# Patient Record
Sex: FEMALE | Race: WHITE | NOT HISPANIC OR LATINO | Employment: FULL TIME | URBAN - METROPOLITAN AREA
[De-identification: names, ages, dates, MRNs, and addresses within clinical notes are randomized per-mention and may not be internally consistent; named-entity substitution may affect disease eponyms.]

---

## 2019-04-19 ENCOUNTER — EH NON-PROVIDER (OUTPATIENT)
Dept: OCCUPATIONAL MEDICINE | Facility: CLINIC | Age: 39
End: 2019-04-19

## 2019-04-19 ENCOUNTER — EMPLOYEE HEALTH (OUTPATIENT)
Dept: OCCUPATIONAL MEDICINE | Facility: CLINIC | Age: 39
End: 2019-04-19

## 2019-04-19 ENCOUNTER — HOSPITAL ENCOUNTER (OUTPATIENT)
Facility: MEDICAL CENTER | Age: 39
End: 2019-04-19
Attending: PREVENTIVE MEDICINE
Payer: COMMERCIAL

## 2019-04-19 VITALS
BODY MASS INDEX: 26.68 KG/M2 | HEART RATE: 86 BPM | WEIGHT: 197 LBS | SYSTOLIC BLOOD PRESSURE: 110 MMHG | HEIGHT: 72 IN | OXYGEN SATURATION: 97 % | TEMPERATURE: 97.4 F | DIASTOLIC BLOOD PRESSURE: 70 MMHG

## 2019-04-19 DIAGNOSIS — Z02.1 PRE-EMPLOYMENT HEALTH SCREENING EXAMINATION: ICD-10-CM

## 2019-04-19 DIAGNOSIS — Z02.1 PRE-EMPLOYMENT DRUG SCREENING: ICD-10-CM

## 2019-04-19 LAB
AMP AMPHETAMINE: NORMAL
BAR BARBITURATES: NORMAL
BZO BENZODIAZEPINES: NORMAL
COC COCAINE: NORMAL
INT CON NEG: NORMAL
INT CON POS: NORMAL
MDMA ECSTASY: NORMAL
MET METHAMPHETAMINES: NORMAL
MTD METHADONE: NORMAL
OPI OPIATES: NORMAL
OXY OXYCODONE: NORMAL
PCP PHENCYCLIDINE: NORMAL
POC URINE DRUG SCREEN OCDRS: NEGATIVE
THC: NORMAL
VZV IGG SER IA-ACNC: 2.63

## 2019-04-19 PROCEDURE — 8915 PR COMPREHENSIVE PHYSICAL: Performed by: PREVENTIVE MEDICINE

## 2019-04-19 PROCEDURE — 94375 RESPIRATORY FLOW VOLUME LOOP: CPT | Performed by: PREVENTIVE MEDICINE

## 2019-04-19 PROCEDURE — 86480 TB TEST CELL IMMUN MEASURE: CPT | Performed by: PREVENTIVE MEDICINE

## 2019-04-19 PROCEDURE — 86787 VARICELLA-ZOSTER ANTIBODY: CPT | Performed by: PREVENTIVE MEDICINE

## 2019-04-19 PROCEDURE — 80305 DRUG TEST PRSMV DIR OPT OBS: CPT | Performed by: PREVENTIVE MEDICINE

## 2019-04-19 PROCEDURE — 86765 RUBEOLA ANTIBODY: CPT | Performed by: PREVENTIVE MEDICINE

## 2019-04-19 PROCEDURE — 86762 RUBELLA ANTIBODY: CPT | Performed by: PREVENTIVE MEDICINE

## 2019-04-19 PROCEDURE — 86735 MUMPS ANTIBODY: CPT | Performed by: PREVENTIVE MEDICINE

## 2019-04-19 RX ORDER — NORETHINDRONE ACETATE AND ETHINYL ESTRADIOL 1MG-20(21)
KIT ORAL
COMMUNITY
Start: 2017-01-23 | End: 2019-06-29 | Stop reason: SDUPTHER

## 2019-04-19 RX ORDER — BUPROPION HYDROCHLORIDE 100 MG/1
TABLET, EXTENDED RELEASE ORAL
COMMUNITY
Start: 2019-02-25 | End: 2019-08-05

## 2019-04-19 RX ORDER — CITALOPRAM 20 MG/1
TABLET ORAL
COMMUNITY
Start: 2018-10-30 | End: 2019-08-05

## 2019-04-19 NOTE — NON-PROVIDER
Drug Screen.  Titers Drawn Today : Quantiferon, MMr, VZV  Immunizations Up To Date: T-dap,   Declined: Hep B  Mask Fit: CAPR N95 r   flu shot record need pt will provide docs.

## 2019-04-20 LAB
MEV IGG SER IA-ACNC: 0.46
MUV IGG SER IA-ACNC: 0.41
RUBV AB SER QL: 14.1 IU/ML

## 2019-04-22 LAB
GAMMA INTERFERON BACKGROUND BLD IA-ACNC: 0.03 IU/ML
M TB IFN-G BLD-IMP: NEGATIVE
M TB IFN-G CD4+ BCKGRND COR BLD-ACNC: 0.03 IU/ML
MITOGEN IGNF BCKGRD COR BLD-ACNC: >10 IU/ML
QFT TB2 - NIL TBQ2: 0 IU/ML

## 2019-04-24 ENCOUNTER — TELEPHONE (OUTPATIENT)
Dept: OCCUPATIONAL MEDICINE | Facility: CLINIC | Age: 39
End: 2019-04-24

## 2019-04-26 ENCOUNTER — EH NON-PROVIDER (OUTPATIENT)
Dept: OCCUPATIONAL MEDICINE | Facility: CLINIC | Age: 39
End: 2019-04-26

## 2019-04-26 DIAGNOSIS — Z23 NEED FOR MMR VACCINE: ICD-10-CM

## 2019-04-26 PROCEDURE — 90707 MMR VACCINE SC: CPT | Performed by: PREVENTIVE MEDICINE

## 2019-04-29 ENCOUNTER — EH NON-PROVIDER (OUTPATIENT)
Dept: OCCUPATIONAL MEDICINE | Facility: CLINIC | Age: 39
End: 2019-04-29
Payer: COMMERCIAL

## 2019-04-29 DIAGNOSIS — Z71.85 IMMUNIZATION COUNSELING: ICD-10-CM

## 2019-04-29 NOTE — PROGRESS NOTES
Pre-employment medical surveillance reviewed and signed. MMR vaccine series required. MMR #1 administered on 4/26/19. MMR #2 scheduled for 5/31/19.   Quantiferon result documented in immunizations. Document returned to monthly assigned MA.

## 2019-05-31 ENCOUNTER — EH NON-PROVIDER (OUTPATIENT)
Dept: OCCUPATIONAL MEDICINE | Facility: CLINIC | Age: 39
End: 2019-05-31

## 2019-05-31 DIAGNOSIS — Z23 NEED FOR VACCINATION: ICD-10-CM

## 2019-05-31 PROCEDURE — 90707 MMR VACCINE SC: CPT | Performed by: PREVENTIVE MEDICINE

## 2019-06-14 ENCOUNTER — TELEPHONE (OUTPATIENT)
Dept: SCHEDULING | Facility: IMAGING CENTER | Age: 39
End: 2019-06-14

## 2019-06-21 ENCOUNTER — OFFICE VISIT (OUTPATIENT)
Dept: URGENT CARE | Facility: PHYSICIAN GROUP | Age: 39
End: 2019-06-21
Payer: COMMERCIAL

## 2019-06-21 VITALS
RESPIRATION RATE: 16 BRPM | TEMPERATURE: 98 F | BODY MASS INDEX: 27.02 KG/M2 | WEIGHT: 199.2 LBS | DIASTOLIC BLOOD PRESSURE: 84 MMHG | HEART RATE: 62 BPM | SYSTOLIC BLOOD PRESSURE: 118 MMHG | OXYGEN SATURATION: 96 %

## 2019-06-21 DIAGNOSIS — S16.1XXA STRAIN OF NECK MUSCLE, INITIAL ENCOUNTER: ICD-10-CM

## 2019-06-21 DIAGNOSIS — R25.2 SPASM: ICD-10-CM

## 2019-06-21 DIAGNOSIS — S46.911A STRAIN OF RIGHT SHOULDER, INITIAL ENCOUNTER: ICD-10-CM

## 2019-06-21 PROCEDURE — 99203 OFFICE O/P NEW LOW 30 MIN: CPT | Performed by: FAMILY MEDICINE

## 2019-06-21 RX ORDER — CITALOPRAM 40 MG/1
40 TABLET ORAL DAILY
COMMUNITY
End: 2020-01-29 | Stop reason: SDUPTHER

## 2019-06-21 RX ORDER — BUPROPION HYDROCHLORIDE 300 MG/1
300 TABLET ORAL EVERY MORNING
COMMUNITY
End: 2019-09-18 | Stop reason: SDUPTHER

## 2019-06-21 RX ORDER — TIZANIDINE 2 MG/1
2 TABLET ORAL 3 TIMES DAILY
Qty: 30 TAB | Refills: 0 | Status: SHIPPED | OUTPATIENT
Start: 2019-06-21 | End: 2020-08-20

## 2019-06-21 ASSESSMENT — ENCOUNTER SYMPTOMS
EYE REDNESS: 0
SENSORY CHANGE: 0
EYE DISCHARGE: 0
WEIGHT LOSS: 0
FOCAL WEAKNESS: 0
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
## 2019-06-28 RX ORDER — NORETHINDRONE ACETATE AND ETHINYL ESTRADIOL 1MG-20(21)
KIT ORAL
Qty: 28 TAB | OUTPATIENT
Start: 2019-06-28 | End: 2023-02-12

## 2019-06-28 NOTE — TELEPHONE ENCOUNTER
1. Caller Name: Caroline Grewal       Call Back Number: [phone] (home)         Patient approves a detailed voicemail message: N\A    Patient called and asking if we can refill her birth control, she does have a upcoming appointment with you as soon as August 5 1pm. She is out of refills. Please advise.

## 2019-06-29 ENCOUNTER — OFFICE VISIT (OUTPATIENT)
Dept: URGENT CARE | Facility: CLINIC | Age: 39
End: 2019-06-29
Payer: COMMERCIAL

## 2019-06-29 VITALS
TEMPERATURE: 97.6 F | WEIGHT: 194 LBS | SYSTOLIC BLOOD PRESSURE: 102 MMHG | HEIGHT: 72 IN | OXYGEN SATURATION: 100 % | RESPIRATION RATE: 18 BRPM | BODY MASS INDEX: 26.28 KG/M2 | DIASTOLIC BLOOD PRESSURE: 60 MMHG | HEART RATE: 64 BPM

## 2019-06-29 DIAGNOSIS — Z76.0 MEDICATION REFILL: ICD-10-CM

## 2019-06-29 DIAGNOSIS — Z30.41 ORAL CONTRACEPTIVE USE: ICD-10-CM

## 2019-06-29 PROCEDURE — 99213 OFFICE O/P EST LOW 20 MIN: CPT | Performed by: NURSE PRACTITIONER

## 2019-06-29 RX ORDER — NORETHINDRONE ACETATE AND ETHINYL ESTRADIOL 1MG-20(21)
1 KIT ORAL DAILY
Qty: 28 TAB | Refills: 1 | Status: SHIPPED | OUTPATIENT
Start: 2019-06-29 | End: 2019-08-05 | Stop reason: SDUPTHER

## 2019-06-29 ASSESSMENT — ENCOUNTER SYMPTOMS
CHILLS: 0
FEVER: 0
MYALGIAS: 0
VOMITING: 0
SORE THROAT: 0
SHORTNESS OF BREATH: 0
DIZZINESS: 0
EYE PAIN: 0
NAUSEA: 0

## 2019-06-29 NOTE — PROGRESS NOTES
Subjective:   Caroline Grewal is a 38 y.o. female who presents for Medication Refill        HPI   Patient is a 38-year-old female presents clinic today for request of medication refill for her oral contraceptive.  Patient states that she has an appointment scheduled for August however she will be out of her birth control medication as of this month.  Patient has been taking the current birth control for 2 to 3 years tolerating well.    Review of Systems   Constitutional: Negative for chills and fever.   HENT: Negative for sore throat.    Eyes: Negative for pain.   Respiratory: Negative for shortness of breath.    Cardiovascular: Negative for chest pain.   Gastrointestinal: Negative for nausea and vomiting.   Genitourinary: Negative for hematuria.   Musculoskeletal: Negative for myalgias.   Skin: Negative for rash.   Neurological: Negative for dizziness.     Allergies   Allergen Reactions   • Dairy Food Allergy Rash     Pt states hands and legs      Objective:   /60 (BP Location: Right arm, Patient Position: Sitting, BP Cuff Size: Adult)   Pulse 64   Temp 36.4 °C (97.6 °F) (Temporal)   Resp 18   Ht 1.829 m (6')   Wt 88 kg (194 lb)   SpO2 100%   BMI 26.31 kg/m²   Physical Exam   Constitutional: She is oriented to person, place, and time. She appears well-developed and well-nourished. No distress.   HENT:   Head: Normocephalic and atraumatic.   Eyes: Pupils are equal, round, and reactive to light. Conjunctivae and EOM are normal.   Cardiovascular: Normal rate and regular rhythm.    No murmur heard.  Pulmonary/Chest: Effort normal and breath sounds normal. No respiratory distress.   Abdominal: Soft. She exhibits no distension. There is no tenderness.   Neurological: She is alert and oriented to person, place, and time. She has normal reflexes. No sensory deficit.   Skin: Skin is warm and dry.   Psychiatric: She has a normal mood and affect.         Assessment/Plan:   1. Medication refill  -  norethindrone-ethinyl estradiol (JUNEL FE 1/20) 1-20 MG-MCG per tablet; Take 1 Tab by mouth every day. TAKE 1 TABLET BY MOUTH DAILY  Dispense: 28 Tab; Refill: 1    2. Oral contraceptive use  - norethindrone-ethinyl estradiol (JUNEL FE 1/20) 1-20 MG-MCG per tablet; Take 1 Tab by mouth every day. TAKE 1 TABLET BY MOUTH DAILY  Dispense: 28 Tab; Refill: 1    Differential diagnosis, natural history, supportive care, and indications for immediate follow-up discussed.

## 2019-07-15 ENCOUNTER — PHYSICAL THERAPY (OUTPATIENT)
Dept: PHYSICAL THERAPY | Facility: REHABILITATION | Age: 39
End: 2019-07-15
Attending: FAMILY MEDICINE
Payer: COMMERCIAL

## 2019-07-15 DIAGNOSIS — S16.1XXA STRAIN OF NECK MUSCLE, INITIAL ENCOUNTER: ICD-10-CM

## 2019-07-15 DIAGNOSIS — S46.911A STRAIN OF RIGHT SHOULDER, INITIAL ENCOUNTER: ICD-10-CM

## 2019-07-15 PROCEDURE — 97110 THERAPEUTIC EXERCISES: CPT

## 2019-07-15 PROCEDURE — 97162 PT EVAL MOD COMPLEX 30 MIN: CPT

## 2019-07-15 PROCEDURE — 97140 MANUAL THERAPY 1/> REGIONS: CPT

## 2019-07-15 ASSESSMENT — ENCOUNTER SYMPTOMS
PAIN SCALE: 5
PAIN TIMING: CONTINUOUSLY
PAIN TIMING: WHEN ACTIVE
ALLEVIATING FACTOR: MUSCLE RELAXER
QUALITY: SHOOTING
PAIN SCALE AT HIGHEST: 8
QUALITY: SHARP
QUALITY: ACHING
PAIN SCALE AT LOWEST: 2
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
## 2019-07-17 ENCOUNTER — APPOINTMENT (OUTPATIENT)
Dept: PHYSICAL THERAPY | Facility: REHABILITATION | Age: 39
End: 2019-07-17
Attending: FAMILY MEDICINE
Payer: COMMERCIAL

## 2019-07-17 NOTE — OP THERAPY DAILY TREATMENT
"  Outpatient Physical Therapy  DAILY TREATMENT     [facility] Physical Therapy [address].  [address]  [place] [address]  Phone:  [phone]  Fax:  [phone]    Date: 07/17/2019    Patient: Caroline Grewal  YOB: 1980  MRN: [id number]     Time Calculation             Chief Complaint: No chief complaint on file.    Visit #: Visit count could not be calculated. Make sure you are using a visit which is associated with an episode.    SUBJECTIVE:  ***    OBJECTIVE:  Current objective measures: ***        Exercises/Treatment  Time-based treatments/modalities:          Pain rating before treatment: {PAIN NUMBERS_1-10:49165}  Pain rating after treatment: {PAIN NUMBERS_1-10:25734}    ASSESSMENT:   Response to treatment: ***    PLAN/RECOMMENDATIONS:   Plan for treatment: {AMB OP THERAPY - THERAPY PLAN:[id number]::\"therapy treatment to continue next visit\"}.  Planned interventions for next visit: {PT PLANNED THERAPY INTERVENTIONS:[id number]}.      "

## 2019-07-25 ENCOUNTER — APPOINTMENT (OUTPATIENT)
Dept: PHYSICAL THERAPY | Facility: REHABILITATION | Age: 39
End: 2019-07-25
Attending: FAMILY MEDICINE
Payer: COMMERCIAL

## 2019-07-29 ENCOUNTER — HOSPITAL ENCOUNTER (OUTPATIENT)
Dept: LAB | Facility: MEDICAL CENTER | Age: 39
End: 2019-07-29
Payer: COMMERCIAL

## 2019-07-29 ENCOUNTER — TELEPHONE (OUTPATIENT)
Dept: MEDICAL GROUP | Facility: LAB | Age: 39
End: 2019-07-29

## 2019-07-29 ENCOUNTER — PHYSICAL THERAPY (OUTPATIENT)
Dept: PHYSICAL THERAPY | Facility: REHABILITATION | Age: 39
End: 2019-07-29
Attending: FAMILY MEDICINE
Payer: COMMERCIAL

## 2019-07-29 DIAGNOSIS — S46.911A STRAIN OF RIGHT SHOULDER, INITIAL ENCOUNTER: ICD-10-CM

## 2019-07-29 DIAGNOSIS — S16.1XXA STRAIN OF NECK MUSCLE, INITIAL ENCOUNTER: ICD-10-CM

## 2019-07-29 PROCEDURE — 97110 THERAPEUTIC EXERCISES: CPT

## 2019-07-29 PROCEDURE — 97140 MANUAL THERAPY 1/> REGIONS: CPT

## 2019-07-29 PROCEDURE — 97014 ELECTRIC STIMULATION THERAPY: CPT

## 2019-07-29 NOTE — OP THERAPY DAILY TREATMENT
Outpatient Physical Therapy  DAILY TREATMENT     [facility] Physical [address]  [address]  [place] [address]  Phone:  [phone]  Fax:  [phone]    Date: 07/29/2019    Patient: Caroline Grewal  YOB: 1980  MRN: [id number]     Time Calculation  Start time: 1300  Stop time: 1358 Time Calculation (min): 58 minutes     Chief Complaint: No chief complaint on file.    Visit #: 2    SUBJECTIVE:  Less burning, still a lot of tightness and achiness, weakness     OBJECTIVE:  Current objective measures:           Therapeutic Exercises (CPT 59788):     1. Foam roller pec stretch cervical neutral, 2 x 30 sec    2. Foam roller alt shoulder flexion/horizontal abduction, x 15    3. Prone scapular retraction cervical neutra, palms up/down 2 x10 each in cervical neutral    4. [place] green tband, foamroller on wall 2 x 30 sec    5. Resisted shoulder ER green TBand, 2 x 10 on roller on wall    Therapeutic Treatments and Modalities:     1. E Stim Unattended (CPT 86372), IFC MHP tpo right thoracic PS, rhomboids and UT    2. Manual Therapy (CPT 82644), suboccipital release, IASTM to right rhomboids, thoracic pasraspinals, UT, cervical manual traction    Time-based treatments/modalities:  Manual therapy minutes (CPT 10298): 10 minutes  Therapeutic exercise minutes (CPT 33657): 20 minutes       ASSESSMENT:   Response to treatment: myofascial restriction right thoracic paraspinals, rhomboids, mid trap    PLAN/RECOMMENDATIONS:   Plan for treatment: therapy treatment to continue next visit.  Planned interventions for next visit: continue with current treatment.

## 2019-07-29 NOTE — TELEPHONE ENCOUNTER
NEW PATIENT VISIT PRE-VISIT PLANNING    1.  EpicCare Patient is checked in Patient Demographics? YES    2.  Immunizations were updated in Epic using WebIZ?: Epic matches WebIZ       •  Web Iz Recommendations: VARICELLA (Chicken Pox)     3.  Is this appointment scheduled as a Hospital Follow-Up? No    4.  Patient is due for the following Health Maintenance Topics:   Health Maintenance Due   Topic Date Due   • PAP SMEAR  10/26/2003           5. Orders for overdue Health Maintenance topics pended in Pre-Charting? N\A    6.  Reviewed/Updated the following with patient:   •   Preferred Pharmacy? NO       •   Preferred Lab? NO       •   Preferred Communication? NO       •   Allergies? NO       •   Medications? NO       •   Social History? NO       •   Family History (document living status of immediate family members and if + hx of cancer, diabetes, hypertension, hyperlipidemia, heart attack, stroke) YES. Was Abstract Encounter opened and chart updated? NO    7.  Updated Care Team?       •   DME Company (gait device, O2, CPAP, etc.) N\A       •   Other Specialists (eye doctor, derm, GYN, cardiology, endo, etc): NO    8.  Patient was informed to arrive 15 min prior to their   scheduled appointment and bring in their medication bottles.

## 2019-08-05 ENCOUNTER — OFFICE VISIT (OUTPATIENT)
Dept: MEDICAL GROUP | Facility: LAB | Age: 39
End: 2019-08-05
Payer: COMMERCIAL

## 2019-08-05 VITALS
HEART RATE: 72 BPM | TEMPERATURE: 97.3 F | BODY MASS INDEX: 27.04 KG/M2 | WEIGHT: 199.6 LBS | HEIGHT: 72 IN | DIASTOLIC BLOOD PRESSURE: 70 MMHG | SYSTOLIC BLOOD PRESSURE: 112 MMHG | RESPIRATION RATE: 14 BRPM | OXYGEN SATURATION: 94 %

## 2019-08-05 DIAGNOSIS — Z76.0 MEDICATION REFILL: ICD-10-CM

## 2019-08-05 DIAGNOSIS — Z30.41 ORAL CONTRACEPTIVE USE: ICD-10-CM

## 2019-08-05 DIAGNOSIS — Z00.00 WELL ADULT EXAM: ICD-10-CM

## 2019-08-05 PROBLEM — M25.511 ACUTE PAIN OF RIGHT SHOULDER: Status: ACTIVE | Noted: 2019-08-05

## 2019-08-05 PROCEDURE — 99395 PREV VISIT EST AGE 18-39: CPT | Performed by: NURSE PRACTITIONER

## 2019-08-05 RX ORDER — NORETHINDRONE ACETATE AND ETHINYL ESTRADIOL 1MG-20(21)
1 KIT ORAL DAILY
Qty: 28 TAB | Refills: 1 | Status: SHIPPED | OUTPATIENT
Start: 2019-08-05 | End: 2019-10-13 | Stop reason: SDUPTHER

## 2019-08-05 SDOH — HEALTH STABILITY: MENTAL HEALTH: HOW OFTEN DO YOU HAVE A DRINK CONTAINING ALCOHOL?: 2-4 TIMES A MONTH

## 2019-08-05 ASSESSMENT — PATIENT HEALTH QUESTIONNAIRE - PHQ9: CLINICAL INTERPRETATION OF PHQ2 SCORE: 0

## 2019-08-05 NOTE — PROGRESS NOTES
Subjective:     CC:   Chief Complaint   Patient presents with   • Annual exam                     HPI:   Caroline Grewal is a 38 y.o. female who presents for annual exam. She is feeling well and denies any complaints.    Ob-Gyn/ History:    Patient has GYN provider: no  /Para:  4/2  Last Pap Smear:  . No history of abnormal pap smears.  Gyn Surgery:  None  Current Contraceptive Method:  OCP. She is currently sexually active.  Last menstrual period.  Periods regular. light bleeding. Cramping is mild.   She does not take OTC analgesics for cramps.  No significant bloating/fluid retention, pelvic pain, or dyspareunia. No vaginal discharge      Health Maintenance    Cholesterol Screening: Lipid panel not ordered as she is getting her healthy tracks appointment scheduled.  Diabetes Screening: Fasting blood sugar ordered  Aspirin Use: Not indicated  Diet: Healthy diet  Exercise: Spinning, [place], Run  Substance Abuse: Declines  Safe in relationship.  Yes  Seat belts, bike helmet, gun safety discussed.  Sun protection used.    Cancer screening  Colorectal Cancer Screening: Due at age 50  Lung Cancer Screening: Not indicated  Cervical Cancer Screening: Due now.  Schedule Pap  Breast Cancer Screening: Due at age 40    Infectious disease screening/Immunizations  --STI Screening: Declines  --Practices safe sex.  --HIV Screening: Declined  --Hepatitis C Screening: Not indicated  --Immunizations:    Influenza: Annually/[employer] employee   Tetanus: Up-to-date 3/18/2016       She  has a past medical history of Anxiety, Depression, and GERD (gastroesophageal reflux disease).  She  has a past surgical history that includes abdominal exploration.    Family History   Problem Relation Age of Onset   • Diabetes Mother    • Heart Disease Father    • No Known Problems Sister        Social History     Socioeconomic History   • Marital status: Single     Spouse name: Not on file   • Number of children: Not on file   • Years of  education: Not on file   • Highest education level: Not on file   Occupational History   • Not on file   Social Needs   • Financial resource strain: Not on file   • Food insecurity:     Worry: Not on file     Inability: Not on file   • Transportation needs:     Medical: Not on file     Non-medical: Not on file   Tobacco Use   • Smoking status: Former Smoker   • Smokeless tobacco: Never Used   Substance and Sexual Activity   • Alcohol use: Yes     Frequency: 2-4 times a month   • Drug use: No   • Sexual activity: Yes     Partners: Male     Birth control/protection: Pill   Lifestyle   • Physical activity:     Days per week: Not on file     Minutes per session: Not on file   • Stress: Not on file   Relationships   • Social connections:     Talks on phone: Not on file     Gets together: Not on file     Attends Hinduism service: Not on file     Active member of club or organization: Not on file     Attends meetings of clubs or organizations: Not on file     Relationship status: Not on file   • Intimate partner violence:     Fear of current or ex partner: Not on file     Emotionally abused: Not on file     Physically abused: Not on file     Forced sexual activity: Not on file   Other Topics Concern   • Not on file   Social History Narrative   • Not on file       Patient Active Problem List    Diagnosis Date Noted   • Acute pain of right shoulder 08/05/2019         Current Outpatient Medications   Medication Sig Dispense Refill   • norethindrone-ethinyl estradiol (JUNEL FE 1/20) 1-20 MG-MCG per tablet Take 1 Tab by mouth every day. TAKE 1 TABLET BY MOUTH DAILY 28 Tab 1   • buPROPion (WELLBUTRIN XL) 300 MG XL tablet Take 300 mg by mouth every morning.     • tizanidine (ZANAFLEX) 2 MG tablet Take 1 Tab by mouth 3 times a day. 30 Tab 0   • citalopram (CELEXA) 40 MG Tab Take 40 mg by mouth every day.       No current facility-administered medications for this visit.      Allergies   Allergen Reactions   • Dairy Food Allergy  Rash     Pt states hands and legs       Review of Systems  Constitutional: Negative for fever, chills and malaise/fatigue.   HENT: Negative for congestion.    Eyes: Negative for pain.   Respiratory: Negative for cough and shortness of breath.    Cardiovascular: Negative for leg swelling.   Gastrointestinal: Negative for nausea, vomiting, abdominal pain and diarrhea.    Genitourinary: Negative for dysuria and hematuria.   Skin: Negative for rash.   Neurological: Negative for dizziness, focal weakness and headaches.   Endo/Heme/Allergies: Does not bruise/bleed easily.   Psychiatric/Behavioral: Negative for depression.  The patient is not nervous/anxious.      Objective:     /70 (BP Location: Right arm, Patient Position: Sitting, BP Cuff Size: Adult)   Pulse 72   Temp 36.3 °C (97.3 °F) (Temporal)   Resp 14   Ht 1.829 m (6')   Wt 90.5 kg (199 lb 9.6 oz)   LMP 08/01/2019 (Approximate)   SpO2 94%   Breastfeeding? No   BMI 27.07 kg/m²   Body mass index is 27.07 kg/m².  Wt Readings from Last 4 Encounters:   08/05/19 90.5 kg (199 lb 9.6 oz)   06/29/19 88 kg (194 lb)   06/21/19 90.4 kg (199 lb 3.2 oz)       Physical Exam:  Constitutional: Well-developed and well-nourished. Not diaphoretic. No distress.   Skin: Skin is warm and dry. No rash noted.  Head: Atraumatic without lesions.  Eyes: Conjunctivae and extraocular motions are normal. Pupils are equal, round, and reactive to light. No scleral icterus.   Ears:  External ears unremarkable. Tympanic membranes clear and intact.  Nose: Nares patent. Septum midline. Turbinates without erythema nor edema. No discharge.   Mouth/Throat:Tongue normal. Oropharynx is clear and moist. Posterior pharynx without erythema or exudates.  Neck: Supple, trachea midline. Normal range of motion. No thyromegaly present. No lymphadenopathy--cervical or supraclavicular.  Cardiovascular: Regular rate and rhythm, S1 and S2 without murmur, rubs, or gallops.  Lungs: Normal inspiratory  effort, CTA bilaterally, no wheezes/rhonchi/rales  Abdomen: Soft, non tender, and without distention. Active bowel sounds in all four quadrants. No rebound, guarding, masses or HSM.  Extremities: No cyanosis, clubbing, erythema, nor edema. Distal pulses intact and symmetric.   Musculoskeletal: All major joints AROM full in all directions without pain.  Neurological: Alert and oriented x 3. Sensation intact. Negative [place].  Psychiatric:  Behavior, mood, and affect are appropriate.      Assessment and Plan:     1. Well adult exam  CBC WITH DIFFERENTIAL    Comp Metabolic Panel    TSH WITH REFLEX TO FT4    VITAMIN D,25 HYDROXY    -       HCM: Reviewed with patient.  Labs per orders  Immunizations per orders  Patient counseled about skin care, diet, supplements, prenatal vitamins, safe sex and exercise.    Follow-up: Return in about 4 weeks (around 9/2/2019) for Pap.

## 2019-08-06 ENCOUNTER — PHYSICAL THERAPY (OUTPATIENT)
Dept: PHYSICAL THERAPY | Facility: REHABILITATION | Age: 39
End: 2019-08-06
Attending: FAMILY MEDICINE
Payer: COMMERCIAL

## 2019-08-06 DIAGNOSIS — S16.1XXA STRAIN OF NECK MUSCLE, INITIAL ENCOUNTER: ICD-10-CM

## 2019-08-06 DIAGNOSIS — S46.911A STRAIN OF RIGHT SHOULDER, INITIAL ENCOUNTER: ICD-10-CM

## 2019-08-06 PROCEDURE — 97014 ELECTRIC STIMULATION THERAPY: CPT

## 2019-08-06 NOTE — OP THERAPY DAILY TREATMENT
Outpatient Physical Therapy  DAILY TREATMENT     [facility] Physical [address].  [address]  [place] [address]  Phone:  [phone]  Fax:  [phone]    Date: 08/06/2019    Patient: Caroline Grewal  YOB: 1980  MRN: [id number]     Time Calculation  Start time: 0800  Stop time: 0846 Time Calculation (min): 46 minutes       Chief Complaint: No chief complaint on file.    Visit #: 3    SUBJECTIVE:  Feeling 25% better right thoracic and shoulder pain/tightness    OBJECTIVE:  Current objective measures:           Therapeutic Exercises (CPT 61971):     1. Foam roller pec stretch cervical neutral, 2 x 30 sec    2. Foam roller alt shoulder flexion/horizontal abduction, x 15    3. Prone scapular retraction cervical neutra, palms up/down 2 x10 each in cervical neutral    4. [place] green tband, foamroller on wall 2 x 30 sec    5. Resisted shoulder ER green TBand, 2 x 10 on roller on wall    6. Wall chiqui , x 20    7. [place] low trap wall slide with lift off, x 20    8. Posture re ed standing, sitting    Therapeutic Treatments and Modalities:     1. E Stim Unattended (CPT 12795), IFC MHP tpo right thoracic PS, rhomboids and UT    2. Manual Therapy (CPT 90909), suboccipital release, IASTM to right rhomboids, thoracic pasraspinals, UT, cervical manual traction    Time-based treatments/modalities:  Manual therapy minutes (CPT 47001): 10 minutes  Therapeutic exercise minutes (CPT 92447): 20 minutes       Pain rating before treatment: 1  Pain rating after treatment: 1    ASSESSMENT:   Response to treatment: improved posture awareness,weakness right low trap compares with left.  Soft tissue restriction right levator scap and rhomboids    PLAN/RECOMMENDATIONS:   Plan for treatment: therapy treatment to continue next visit.  Planned interventions for next visit: continue with current treatment.

## 2019-08-12 ENCOUNTER — HOSPITAL ENCOUNTER (OUTPATIENT)
Facility: MEDICAL CENTER | Age: 39
End: 2019-08-12
Attending: NURSE PRACTITIONER
Payer: COMMERCIAL

## 2019-08-12 ENCOUNTER — OFFICE VISIT (OUTPATIENT)
Dept: MEDICAL GROUP | Facility: LAB | Age: 39
End: 2019-08-12
Payer: COMMERCIAL

## 2019-08-12 VITALS
DIASTOLIC BLOOD PRESSURE: 66 MMHG | RESPIRATION RATE: 18 BRPM | BODY MASS INDEX: 26.44 KG/M2 | HEIGHT: 72 IN | TEMPERATURE: 97.9 F | SYSTOLIC BLOOD PRESSURE: 104 MMHG | OXYGEN SATURATION: 98 % | WEIGHT: 195.2 LBS | HEART RATE: 68 BPM

## 2019-08-12 DIAGNOSIS — Z01.419 WELL WOMAN EXAM WITH ROUTINE GYNECOLOGICAL EXAM: ICD-10-CM

## 2019-08-12 DIAGNOSIS — N63.0 BREAST LUMP IN FEMALE: ICD-10-CM

## 2019-08-12 PROBLEM — N63.21 BREAST LUMP ON LEFT SIDE AT 2 O'CLOCK POSITION: Status: RESOLVED | Noted: 2019-08-12 | Resolved: 2019-08-12

## 2019-08-12 PROBLEM — N63.21 BREAST LUMP ON LEFT SIDE AT 2 O'CLOCK POSITION: Status: ACTIVE | Noted: 2019-08-12

## 2019-08-12 PROCEDURE — 88175 CYTOPATH C/V AUTO FLUID REDO: CPT

## 2019-08-12 PROCEDURE — 87624 HPV HI-RISK TYP POOLED RSLT: CPT

## 2019-08-12 PROCEDURE — 99000 SPECIMEN HANDLING OFFICE-LAB: CPT | Performed by: NURSE PRACTITIONER

## 2019-08-12 PROCEDURE — 99395 PREV VISIT EST AGE 18-39: CPT | Performed by: NURSE PRACTITIONER

## 2019-08-12 NOTE — PROGRESS NOTES
SUBJECTIVE: Caroline Grewal is a 38 y.o. No obstetric history on file. female who is her today for annual routine gynecologic exam    OB History    Para Term  AB Living   4 2 0 0 0 0   SAB TAB Ectopic Molar Multiple Live Births   0 0 0 0 0 0      Last Pap:   Social History     Substance and Sexual Activity   Sexual Activity Yes   • Partners: Male   • Birth control/protection: Pill     Sexual history: currently sexually active, on oral contraceptives   H/O Abnormal Pap yes One abnormal but was normal on recheck  She  reports that she has quit smoking. She has never used smokeless tobacco.        Allergies: Dairy food allergy     ROS:        Menses every month with 4-5 days moderate bleeding   Cramping is mild.       No significant bloating/fluid retention, pelvic pain, or dyspareunia. No vaginal discharge   No breast tenderness, mass, nipple discharge, changes in size or contour, or abnormal cyclic discomfort.  No urinary tract symptoms, no incontinence, no polydipsia, polyuria,  No abdominal pain, change in bowel habits, black or bloody stools.    No unusual headaches, no visual changes, menstrual migraines   No prolonged cough. No dyspnea or chest pain on exertion.  No depression, labile mood, anxiety, libido changes, insomnia.  No temperature intolerance.  No new/concerning skin lesions, concerns.     Exercise: sporadic irregular exercise    Preventive Care:  Mammogram: Due at age 42  Colonoscopy: Due at age 52  Tetanus booster: Last done 3/18/2016  Flu vaccine: Done yearly/[employer] employee  ASA: Not indicated      Current medicines (including changes today)  Current Outpatient Medications   Medication Sig Dispense Refill   • norethindrone-ethinyl estradiol (JUNEL FE 1/20) 1-20 MG-MCG per tablet Take 1 Tab by mouth every day. TAKE 1 TABLET BY MOUTH DAILY 28 Tab 1   • citalopram (CELEXA) 40 MG Tab Take 40 mg by mouth every day.     • buPROPion (WELLBUTRIN XL) 300 MG XL tablet Take 300 mg by mouth  every morning.     • tizanidine (ZANAFLEX) 2 MG tablet Take 1 Tab by mouth 3 times a day. 30 Tab 0     No current facility-administered medications for this visit.      She  has a past medical history of Anxiety, Depression, and GERD (gastroesophageal reflux disease).  She  has a past surgical history that includes abdominal exploration.     Family History:   Family History   Problem Relation Age of Onset   • Diabetes Mother    • Heart Disease Father    • No Known Problems Sister        OBJECTIVE:   /66 (BP Location: Left arm, Patient Position: Sitting, BP Cuff Size: Adult)   Pulse 68   Temp 36.6 °C (97.9 °F) (Temporal)   Resp 18   Wt 88.5 kg (195 lb 3.2 oz)   LMP 08/01/2019 (Approximate)   SpO2 98%   BMI 26.47 kg/m²   Body mass index is 26.47 kg/m².    HEENT: NC/AT, MMM, oropharynx clear  NECK:  No cervical or supraclavicular SANTHOSH  NEURO: Cranial nerves II-XII grossly intact  CARDIOVASCULAR:  Regular rate and rhythm.  S1 and S2 normal.  No edema  LUNGS: CTAB  ABDOMEN:  Soft without tenderness, guarding, mass or organomegaly.  No CVA tenderness or inguinal adenopathy.   EXTREMITIES:  peripheral pulses are 2+.   SKIN: color normal, vascularity normal, temperature normal. No rashes or suspicious skin lesions noted.  BREAST: Performed with instruction during examination. No axillary lymphadenopathy, no skin changes, No nipple retraction.  Palpable freely moveable lump in the 2 oclock position of left outer breast.  Palpable freely moveable lump in the 10 oclock position of right outer breast.    Pelvic Exam -  Normal external genitalia with no lesions. Normal vaginal mucosa with normal rugation and moderate amount of bloody discharge. Cervix with no visible lesions. No cervical motion tenderness. Uterus is normal sized with no masses. No adnexal tenderness or enlargement appreciated. Thin Prep Pap is obtained, vaginal swab is not obtained and specimen(s) sent to lab    ASSESSMENT and PLAN    1. Well woman  exam with routine gynecological exam  Healthy well woman exam. Patient did have moderate amount of blood in the cervical os.   - THINPREP PAP WITH HPV; Future    2. Breast lump in female  Bilateral palpable lumps.  Patient reports that she knew the one on the left was there but was unaware of one on the right.   - MA-DIAGNOSTIC MAMMO W/CAD-BILAT; Standing  -      Pap and physical exam performed  STI screening Steen  Counseling: breast self exam, STD prevention, HIV risk factors and prevention, family planning choices and adequate intake of calcium and vitamin D  Encourage exercise and proper diet.  Mammograms starting @ age 40 annually.  Well woman exam yearly.  Discussed other forms of contraception including IUD, nexplanon, OCP, condoms, patch and ring.        Discussed  mammography screening, adequate intake of calcium and vitamin D, diet and exercise, Kegel's exercises   Follow-up in 3 years for next Pap if results are normal.   Next office visit for recheck of chronic medical conditions is due in  1 year      Please note that this dictation was created using voice recognition software. I have made every reasonable attempt to correct obvious errors, but I expect that there are errors of grammar and possibly content that I did not discover before finalizing the note.

## 2019-08-13 ENCOUNTER — HOSPITAL ENCOUNTER (OUTPATIENT)
Dept: LAB | Facility: MEDICAL CENTER | Age: 39
End: 2019-08-13
Payer: COMMERCIAL

## 2019-08-13 ENCOUNTER — HOSPITAL ENCOUNTER (OUTPATIENT)
Dept: LAB | Facility: MEDICAL CENTER | Age: 39
End: 2019-08-13
Attending: NURSE PRACTITIONER
Payer: COMMERCIAL

## 2019-08-13 DIAGNOSIS — Z01.419 WELL WOMAN EXAM WITH ROUTINE GYNECOLOGICAL EXAM: ICD-10-CM

## 2019-08-13 DIAGNOSIS — Z00.00 WELL ADULT EXAM: ICD-10-CM

## 2019-08-13 LAB
25(OH)D3 SERPL-MCNC: 30 NG/ML (ref 30–100)
ALBUMIN SERPL BCP-MCNC: 4.4 G/DL (ref 3.2–4.9)
ALBUMIN/GLOB SERPL: 1.4 G/DL
ALP SERPL-CCNC: 35 U/L (ref 30–99)
ALT SERPL-CCNC: 15 U/L (ref 2–50)
ANION GAP SERPL CALC-SCNC: 7 MMOL/L (ref 0–11.9)
AST SERPL-CCNC: 20 U/L (ref 12–45)
BASOPHILS # BLD AUTO: 1.1 % (ref 0–1.8)
BASOPHILS # BLD: 0.08 K/UL (ref 0–0.12)
BDY FAT % MEASURED: 26.9 %
BILIRUB SERPL-MCNC: 0.6 MG/DL (ref 0.1–1.5)
BP DIAS: 61 MMHG
BP SYS: 110 MMHG
BUN SERPL-MCNC: 12 MG/DL (ref 8–22)
CALCIUM SERPL-MCNC: 9.9 MG/DL (ref 8.5–10.5)
CHLORIDE SERPL-SCNC: 104 MMOL/L (ref 96–112)
CHOLEST SERPL-MCNC: 219 MG/DL (ref 100–199)
CO2 SERPL-SCNC: 26 MMOL/L (ref 20–33)
CREAT SERPL-MCNC: 1.08 MG/DL (ref 0.5–1.4)
CYTOLOGY REG CYTOL: NORMAL
DIABETES HTDIA: NO
EOSINOPHIL # BLD AUTO: 0.37 K/UL (ref 0–0.51)
EOSINOPHIL NFR BLD: 5.1 % (ref 0–6.9)
ERYTHROCYTE [DISTWIDTH] IN BLOOD BY AUTOMATED COUNT: 44.5 FL (ref 35.9–50)
EVENT NAME HTEVT: NORMAL
FASTING HTFAS: YES
GLOBULIN SER CALC-MCNC: 3.1 G/DL (ref 1.9–3.5)
GLUCOSE SERPL-MCNC: 86 MG/DL (ref 65–99)
GLUCOSE SERPL-MCNC: 92 MG/DL (ref 65–99)
HCT VFR BLD AUTO: 40.5 % (ref 37–47)
HDLC SERPL-MCNC: 57 MG/DL
HGB BLD-MCNC: 13 G/DL (ref 12–16)
HPV HR 12 DNA CVX QL NAA+PROBE: NEGATIVE
HPV16 DNA SPEC QL NAA+PROBE: NEGATIVE
HPV18 DNA SPEC QL NAA+PROBE: NEGATIVE
HYPERTENSION HTHYP: NO
IMM GRANULOCYTES # BLD AUTO: 0.03 K/UL (ref 0–0.11)
IMM GRANULOCYTES NFR BLD AUTO: 0.4 % (ref 0–0.9)
LDLC SERPL CALC-MCNC: 133 MG/DL
LYMPHOCYTES # BLD AUTO: 2.27 K/UL (ref 1–4.8)
LYMPHOCYTES NFR BLD: 31.2 % (ref 22–41)
MCH RBC QN AUTO: 30.8 PG (ref 27–33)
MCHC RBC AUTO-ENTMCNC: 32.1 G/DL (ref 33.6–35)
MCV RBC AUTO: 96 FL (ref 81.4–97.8)
MONOCYTES # BLD AUTO: 0.6 K/UL (ref 0–0.85)
MONOCYTES NFR BLD AUTO: 8.3 % (ref 0–13.4)
NEUTROPHILS # BLD AUTO: 3.92 K/UL (ref 2–7.15)
NEUTROPHILS NFR BLD: 53.9 % (ref 44–72)
NRBC # BLD AUTO: 0 K/UL
NRBC BLD-RTO: 0 /100 WBC
PLATELET # BLD AUTO: 297 K/UL (ref 164–446)
PMV BLD AUTO: 9.9 FL (ref 9–12.9)
POTASSIUM SERPL-SCNC: 4.8 MMOL/L (ref 3.6–5.5)
PROT SERPL-MCNC: 7.5 G/DL (ref 6–8.2)
RBC # BLD AUTO: 4.22 M/UL (ref 4.2–5.4)
SCREENING LOC CITY HTCIT: NORMAL
SCREENING LOC STATE HTSTA: NORMAL
SCREENING LOCATION HTLOC: NORMAL
SMOKING HTSMO: NO
SODIUM SERPL-SCNC: 137 MMOL/L (ref 135–145)
SPECIMEN SOURCE: NORMAL
SUBSCRIBER ID HTSID: NORMAL
TRIGL SERPL-MCNC: 145 MG/DL (ref 0–149)
TSH SERPL DL<=0.005 MIU/L-ACNC: 1.91 UIU/ML (ref 0.38–5.33)
WBC # BLD AUTO: 7.3 K/UL (ref 4.8–10.8)

## 2019-08-13 PROCEDURE — S5190 WELLNESS ASSESSMENT BY NONPH: HCPCS

## 2019-08-13 PROCEDURE — 82947 ASSAY GLUCOSE BLOOD QUANT: CPT

## 2019-08-13 PROCEDURE — 82306 VITAMIN D 25 HYDROXY: CPT

## 2019-08-13 PROCEDURE — 85025 COMPLETE CBC W/AUTO DIFF WBC: CPT

## 2019-08-13 PROCEDURE — 84443 ASSAY THYROID STIM HORMONE: CPT

## 2019-08-13 PROCEDURE — 36415 COLL VENOUS BLD VENIPUNCTURE: CPT

## 2019-08-13 PROCEDURE — 80061 LIPID PANEL: CPT

## 2019-08-13 PROCEDURE — 80053 COMPREHEN METABOLIC PANEL: CPT

## 2019-08-16 ENCOUNTER — PHYSICAL THERAPY (OUTPATIENT)
Dept: PHYSICAL THERAPY | Facility: REHABILITATION | Age: 39
End: 2019-08-16
Attending: FAMILY MEDICINE
Payer: COMMERCIAL

## 2019-08-16 DIAGNOSIS — S46.911A STRAIN OF RIGHT SHOULDER, INITIAL ENCOUNTER: ICD-10-CM

## 2019-08-16 DIAGNOSIS — S16.1XXA STRAIN OF NECK MUSCLE, INITIAL ENCOUNTER: ICD-10-CM

## 2019-08-16 PROCEDURE — 97014 ELECTRIC STIMULATION THERAPY: CPT

## 2019-08-16 PROCEDURE — 97140 MANUAL THERAPY 1/> REGIONS: CPT

## 2019-08-16 PROCEDURE — 97110 THERAPEUTIC EXERCISES: CPT

## 2019-08-16 NOTE — OP THERAPY DAILY TREATMENT
Outpatient Physical Therapy  DAILY TREATMENT     [facility] Physical [address].  [address]  [place] [address]  Phone:  [phone]  Fax:  [phone]    Date: 08/16/2019    Patient: Caroline Grewal  YOB: 1980  MRN: [id number]     Time Calculation  Start time: 1602  Stop time: 1650 Time Calculation (min): 48 minutes       Chief Complaint: Neck Pain    Visit #: 4    SUBJECTIVE:  Feeling a little better.  Doing HEP.    OBJECTIVE:        Therapeutic Exercises (CPT 42473):     1. Foam roller pec stretch cervical neutral, 2 x 30 sec    2. Foam roller alt shoulder flexion/horizontal abduction, x 15    3. Prone scapular retraction cervical neutral over ball, palms up/down 2 x10 each     4. Serratus wall push up, x10    5. Resisted shoulder ER green TBand, 2 x 10 on roller on wall    6. Wall chiqui , x 20, R shoulder pain at endrange abd, instructed to stop short of pain.    7. Sarhman low trap wall slide with lift off, HEP    Therapeutic Treatments and Modalities:     1. E Stim Unattended (CPT 65321), IFC MHP tpo right thoracic PS, rhomboids and UT    2. Manual Therapy (CPT 59437), STM to thoracic pasraspinals, UT, C/S paraspinals and levator scap, cervical manual traction, T3-6 PAs Gr III.    Time-based treatments/modalities:  Manual therapy minutes (CPT 98614): 15 minutes  Therapeutic exercise minutes (CPT 03012): 15 minutes       ASSESSMENT:   Response to treatment: Improving symptoms     PLAN/RECOMMENDATIONS:   Plan for treatment: therapy treatment to continue next visit.  Planned interventions for next visit: continue with current treatment.

## 2019-08-20 DIAGNOSIS — M54.2 NECK PAIN, MUSCULOSKELETAL: ICD-10-CM

## 2019-08-20 DIAGNOSIS — M25.511 RIGHT SHOULDER PAIN, UNSPECIFIED CHRONICITY: ICD-10-CM

## 2019-08-22 ENCOUNTER — PHYSICAL THERAPY (OUTPATIENT)
Dept: PHYSICAL THERAPY | Facility: REHABILITATION | Age: 39
End: 2019-08-22
Attending: FAMILY MEDICINE
Payer: COMMERCIAL

## 2019-08-22 DIAGNOSIS — S16.1XXA STRAIN OF NECK MUSCLE, INITIAL ENCOUNTER: ICD-10-CM

## 2019-08-22 DIAGNOSIS — S46.911A STRAIN OF RIGHT SHOULDER, INITIAL ENCOUNTER: ICD-10-CM

## 2019-08-22 PROCEDURE — 97014 ELECTRIC STIMULATION THERAPY: CPT

## 2019-08-22 PROCEDURE — 97110 THERAPEUTIC EXERCISES: CPT

## 2019-08-22 PROCEDURE — 97140 MANUAL THERAPY 1/> REGIONS: CPT

## 2019-08-22 NOTE — OP THERAPY DAILY TREATMENT
Outpatient Physical Therapy  DAILY TREATMENT     [facility] Physical [address].  [address]  [place] [address]  Phone:  [phone]  Fax:  [phone]    Date: 08/22/2019    Patient: Caroline Grewal  YOB: 1980  MRN: [id number]     Time Calculation  Start time: 1030  Stop time: 1115 Time Calculation (min): 45 minutes       Chief Complaint: neck problem  Visit #: 5    SUBJECTIVE:better, right shoulder is popping a lot, trying to work on posture      OBJECTIVE:  Current objective measures:           Therapeutic Exercises (CPT 23632):     1. Foam roller pec stretch cervical neutral, 2 x 30 sec    2. Foam roller alt shoulder flexion/horizontal abduction, x 15    3. Prone scapular retraction cervical neutral over ball, palms up/down 2 x10 each     4. Serratus wall push up, x10    5. Resisted shoulder ER green TBand, 2 x 10 on roller on wall    6. Wall chiqui , x 20, R shoulder pain at endrange abd, instructed to stop short of pain.    7. Sarhman low trap wall slide with lift off, x 15    8. Repeated cervical retraction/extension with rotation supine , x15    Therapeutic Treatments and Modalities:     1. E Stim Unattended (CPT 84608), IFC MHP to bilateral UT, lower PSids and UT    2. Manual Therapy (CPT 70922), STM to thoracic pasraspinals, UT, C/S paraspinals and levator scap, cervical manual traction, T3-6 PAs Gr III.    Time-based treatments/modalities:  Manual therapy minutes (CPT 79848): 10 minutes  Therapeutic exercise minutes (CPT 22306): 20 minutes       Pain rating before treatment: 0  Pain rating after treatment: 0    ASSESSMENT:   Response to treatment: responds to cervical retraction/extension supine with rotation.  Symptoms centralize, improved lower cervical ROM extension post exercise and decreased scapular symptomsImproved posture awareness    PLAN/RECOMMENDATIONS:   Plan for treatment: therapy treatment to continue next visit.  Planned interventions for next  visit: continue with current treatment.

## 2019-08-26 ENCOUNTER — PHYSICAL THERAPY (OUTPATIENT)
Dept: PHYSICAL THERAPY | Facility: REHABILITATION | Age: 39
End: 2019-08-26
Attending: FAMILY MEDICINE
Payer: COMMERCIAL

## 2019-08-26 DIAGNOSIS — S16.1XXA STRAIN OF NECK MUSCLE, INITIAL ENCOUNTER: ICD-10-CM

## 2019-08-26 DIAGNOSIS — S46.911A STRAIN OF RIGHT SHOULDER, INITIAL ENCOUNTER: ICD-10-CM

## 2019-08-26 PROCEDURE — 97140 MANUAL THERAPY 1/> REGIONS: CPT

## 2019-08-26 PROCEDURE — 97110 THERAPEUTIC EXERCISES: CPT

## 2019-08-26 PROCEDURE — 97014 ELECTRIC STIMULATION THERAPY: CPT

## 2019-08-26 NOTE — OP THERAPY DAILY TREATMENT
Outpatient Physical Therapy  DAILY TREATMENT     [facility] Physical [address].  [address]  [place] [address]  Phone:  [phone]  Fax:  [phone]    Date: 08/26/2019    Patient: Caroline Grewal  YOB: 1980  MRN: [id number]     Time Calculation  Start time: 1530  Stop time: 1615 Time Calculation (min): 45 minutes       Chief Complaint: shoulder problem  Visit #: 6    SUBJECTIVE:  Doing well, performing cervical retraction frequently throughout work day    OBJECTIVE:  Current objective measures: + empty can seated but - supine, seated right shoulder ER/IR strong and pain free, + impingement with horizontal add/IR @ 90 degrees flexion          Therapeutic Exercises (CPT 36505):     1. Cervical retraction/extension with rotation, supine x 10    2. Cervical retraction seated, x 10    3. Thoracic extension self mob with roller, x 3 min    4. Thoracic chairback extension with OP, x 15    5. Posture correction    6. Scapular retraction prone cervical neutral     7. Posture correction seated    Therapeutic Treatments and Modalities:     1. Manual Therapy (CPT 81144), IASTM right infraspinatus,cervical manual traction 2 x 2 min    Time-based treatments/modalities:  Manual therapy minutes (CPT 07131): 10 minutes  Therapeutic exercise minutes (CPT 38019): 20 minutes       Pain rating before treatment: 2  Pain rating after treatment: 0    ASSESSMENT:   Response to treatment: right shoulder positioned in protraction/no significant signs for shoulder dysfunction except + infraspinatus soft tissue restriction with IASTM but no change post treatment.  Patient does demonstrate decreased shoulder pain when holding  cervical retraction/neutral posture seated and scapular retraction. Next treatment progress scapular and cervical  stabilization .    PLAN/RECOMMENDATIONS:   Plan for treatment: therapy treatment to continue next visit.  Planned interventions for next visit: continue with  current treatment.

## 2019-08-26 NOTE — OP THERAPY DAILY TREATMENT
Outpatient Physical Therapy  DAILY TREATMENT     [facility] Physical [address].  [address]  [place] [address]  Phone:  [phone]  Fax:  [phone]    Date: 08/26/2019    Patient: Caroline Grewal  YOB: 1980  MRN: [id number]     Time Calculation               Chief Complaint: No chief complaint on file.    Visit #: 6    SUBJECTIVE:  Feeling better no symptoms currently except for tightness    OBJECTIVE:  Current objective measures:           Therapeutic Exercises (CPT 28031):     1. Foam roller pec stretch cervical neutral, 2 x 30 sec    2. Foam roller alt shoulder flexion/horizontal abduction, x 15, verbal review only    3. Prone scapular retraction cervical neutral over ball, palms up/down 2 x10 each     4. Serratus wall push up, x10    5. Resisted shoulder ER green TBand, 2 x 10 on roller on wall    6. Wall chiqui , x 20, R shoulder pain at endrange abd, instructed to stop short of pain.    7. Sarhman low trap wall slide with lift off, x 15    8. Repeated cervical retraction/extension with rotation supine , x15    Therapeutic Treatments and Modalities:     1. E Stim Unattended (CPT 63190), IFC MHP to bilateral UT, lower PSids and UT    2. Manual Therapy (CPT 01937), STM to thoracic pasraspinals, UT, C/S paraspinals and levator scap, cervical manual traction, T3-6 PAs Gr III.    Time-based treatments/modalities:          Pain rating before treatment: 0  Pain rating after treatment: 0    ASSESSMENT:   Response to treatment: improved posture awareness, good compliance with home program,    PLAN/RECOMMENDATIONS:   Plan for treatment: therapy treatment to continue next visit.  Planned interventions for next visit: continue with current treatment.

## 2019-08-28 DIAGNOSIS — N63.0 BREAST LUMP IN FEMALE: ICD-10-CM

## 2019-08-29 ENCOUNTER — HOSPITAL ENCOUNTER (OUTPATIENT)
Dept: RADIOLOGY | Facility: MEDICAL CENTER | Age: 39
End: 2019-08-29
Attending: NURSE PRACTITIONER
Payer: COMMERCIAL

## 2019-08-29 DIAGNOSIS — M54.2 NECK PAIN, MUSCULOSKELETAL: ICD-10-CM

## 2019-08-29 DIAGNOSIS — M25.511 RIGHT SHOULDER PAIN, UNSPECIFIED CHRONICITY: ICD-10-CM

## 2019-08-29 PROCEDURE — 73030 X-RAY EXAM OF SHOULDER: CPT | Mod: RT

## 2019-08-29 PROCEDURE — 72040 X-RAY EXAM NECK SPINE 2-3 VW: CPT

## 2019-09-05 ENCOUNTER — PHYSICAL THERAPY (OUTPATIENT)
Dept: PHYSICAL THERAPY | Facility: REHABILITATION | Age: 39
End: 2019-09-05
Attending: FAMILY MEDICINE
Payer: COMMERCIAL

## 2019-09-05 ENCOUNTER — HOSPITAL ENCOUNTER (OUTPATIENT)
Dept: RADIOLOGY | Facility: MEDICAL CENTER | Age: 39
End: 2019-09-05

## 2019-09-05 DIAGNOSIS — S16.1XXA STRAIN OF NECK MUSCLE, INITIAL ENCOUNTER: ICD-10-CM

## 2019-09-05 DIAGNOSIS — S46.911A STRAIN OF RIGHT SHOULDER, INITIAL ENCOUNTER: ICD-10-CM

## 2019-09-05 PROCEDURE — 97012 MECHANICAL TRACTION THERAPY: CPT

## 2019-09-05 PROCEDURE — 97110 THERAPEUTIC EXERCISES: CPT

## 2019-09-05 NOTE — OP THERAPY DAILY TREATMENT
Outpatient Physical Therapy  DAILY TREATMENT     [facility] Physical [address].  [address]  [place] [address]  Phone:  [phone]  Fax:  [phone]    Date: 09/05/2019    Patient: Caroline Grewal  YOB: 1980  MRN: [id number]     Time Calculation  Start time: 0830  Stop time: 0920 Time Calculation (min): 50 minutes       Chief Complaint: shoulder and neck problem  Visit #: 7    SUBJECTIVE: x ray negative for right shoulder, more good days than bad days, being more aware of posture throughout the day      OBJECTIVE:  Current objective measures:           Therapeutic Exercises (CPT 15556):     1. [place] with baloon cervical stab on wall, 4 x 30 sec    2. Foam roller cervical stab with alt shoulder flexion, horizontal abduction, x 15 each    3. Foam roller pec stretch, 3 x 30 sec    4. Cervical retraction/extension seated, x 15    5. Thoracic extension in chair, x 15    6. Foam roller alt shoulder flexion, horizontal abduction, scapular protraction/retraction, x 15 each    7. Foam roller orange tband horizontal abduction, x 15    8. Foam roller box Low trap with orange tband resistance, x 15    9. Sarhman wall slide with lift off, x 15    10. Wall chiqui, x 15    Therapeutic Treatments and Modalities:     1. Mechanical Traction (CPT 00788), cervical 17/8 60/20 with MHP cervical x 15 min    2. Manual Therapy (CPT 09855), scapular mob SL right, cervical retraction mobilization seated    Time-based treatments/modalities:  Manual therapy minutes (CPT 55651): 8 minutes  Therapeutic exercise minutes (CPT 76505): 25 minutes       Pain rating before treatment: 0  Pain rating after treatment: 1    ASSESSMENT:   Response to treatment: increased left scapular pain after there ex but otherwise tolerated well, traction: decrease/better    PLAN/RECOMMENDATIONS:   Plan for treatment: therapy treatment to continue next visit.  Planned interventions for next visit: continue with current  treatment.

## 2019-09-09 ENCOUNTER — PHYSICAL THERAPY (OUTPATIENT)
Dept: PHYSICAL THERAPY | Facility: REHABILITATION | Age: 39
End: 2019-09-09
Attending: FAMILY MEDICINE
Payer: COMMERCIAL

## 2019-09-09 ENCOUNTER — HOSPITAL ENCOUNTER (OUTPATIENT)
Dept: RADIOLOGY | Facility: MEDICAL CENTER | Age: 39
End: 2019-09-09
Attending: NURSE PRACTITIONER
Payer: COMMERCIAL

## 2019-09-09 DIAGNOSIS — S46.911A STRAIN OF RIGHT SHOULDER, INITIAL ENCOUNTER: ICD-10-CM

## 2019-09-09 DIAGNOSIS — N63.0 BREAST LUMP IN FEMALE: ICD-10-CM

## 2019-09-09 DIAGNOSIS — S16.1XXA STRAIN OF NECK MUSCLE, INITIAL ENCOUNTER: ICD-10-CM

## 2019-09-09 PROCEDURE — 97012 MECHANICAL TRACTION THERAPY: CPT

## 2019-09-09 PROCEDURE — G0279 TOMOSYNTHESIS, MAMMO: HCPCS

## 2019-09-09 PROCEDURE — 76642 ULTRASOUND BREAST LIMITED: CPT | Mod: LT

## 2019-09-09 NOTE — OP THERAPY DAILY TREATMENT
Outpatient Physical Therapy  DAILY TREATMENT     [facility] Physical Therapy [place] [address] [facility], [address]  [place] [address]  Phone:  [phone]    Date: 09/09/2019    Patient: Caroline Grewal  YOB: 1980  MRN: [id number]     Time Calculation  Start time: 0830  Stop time: 0915 Time Calculation (min): 45 minutes       Chief Complaint: shoulder problem  Visit #: 8    SUBJECTIVE:  No symptom. Able to work 2 full days without symptoms.    OBJECTIVE:  Current objective measures:           Therapeutic Exercises (CPT 91904):     1. [place] with balloon cervical stab on wall, 4 x 30 sec    2. Foam roller cervical stab with alt shoulder flexion, horizontal abduction, x 15 each    3. Foam roller pec stretch, 3 x 30 sec    4. Cervical retraction/extension seated, x 15    5. Thoracic extension in chair, x 15    6. Foam roller alt shoulder flexion, horizontal abduction, scapular protraction/retraction, x 15 each    7. Foam roller orange tband horizontal abduction, x 20    8. Foam roller box Low trap with orange tband resistance, x 15    9. Sarhman wall slide with lift off, x 15    10. Wall chiqui, x 15    Therapeutic Treatments and Modalities:     1. Mechanical Traction (CPT 64557), cervical 17/8 60/20 with MHP cervical x 15 min    2. Manual Therapy (CPT 41095), scapular mob SL right, cervical retraction mobilization seated    Time-based treatments/modalities:  Manual therapy minutes (CPT 63021): 8 minutes  Therapeutic exercise minutes (CPT 11872): 20 minutes       Pain rating after treatment: 0/10 VAS    ASSESSMENT:   Response to treatment: improved scapular stability/low trap activation right, decreased compensation with the thoracic spine.      PLAN/RECOMMENDATIONS:   Plan for treatment: therapy treatment to continue next visit.  Planned interventions for next visit: continue with current treatment next visit in 2 weeks.

## 2019-09-13 ENCOUNTER — OFFICE VISIT (OUTPATIENT)
Dept: MEDICAL GROUP | Facility: LAB | Age: 39
End: 2019-09-13
Payer: COMMERCIAL

## 2019-09-13 VITALS
TEMPERATURE: 97.3 F | RESPIRATION RATE: 18 BRPM | WEIGHT: 196.2 LBS | DIASTOLIC BLOOD PRESSURE: 68 MMHG | OXYGEN SATURATION: 97 % | HEIGHT: 72 IN | HEART RATE: 62 BPM | SYSTOLIC BLOOD PRESSURE: 110 MMHG | BODY MASS INDEX: 26.57 KG/M2

## 2019-09-13 DIAGNOSIS — F41.9 ANXIETY: ICD-10-CM

## 2019-09-13 PROCEDURE — 99214 OFFICE O/P EST MOD 30 MIN: CPT | Performed by: NURSE PRACTITIONER

## 2019-09-13 RX ORDER — HYDROXYZINE 50 MG/1
50 TABLET, FILM COATED ORAL 3 TIMES DAILY PRN
Qty: 30 TAB | Refills: 0 | Status: SHIPPED | OUTPATIENT
Start: 2019-09-13 | End: 2020-01-27 | Stop reason: SDUPTHER

## 2019-09-13 NOTE — PROGRESS NOTES
CC:The encounter diagnosis was Anxiety.    HISTORY OF PRESENT ILLNESS: Caroline Grewal is a 38 y.o. female established patient who presents today to discuss the following problems:    Anxiety  There is a new to me, chronic problem for which patient has been seen by behavioral health and was started on Celexa several years ago.  She was prescribed 40 mg of Celexa and she is also on 300 mg of Wellbutrin daily.  She recently has had 2 episodes of what she believed were panic attacks.  She reports that she was at work with 1 of them and broke out with sweats, tremors and feeling very claustrophobic.  She also reported that afterwards she was very emotional and felt like she was very foggy for a while.  She was in the cafeteria at the time and felt like she would pass out if she tried to leave.  At first she thought that they might have been hypoglycemic events however upon obtaining history, patient did report that she decided to try to wean herself off the Celexa approximately 1 month ago and had cut her dose in half.  Previously she had not been having any panic attacks but does have a history of them.  She does not have anything for breakthrough anxiety and has not discussed this with her behavioral health provider.  She does also report increased stress in her life at the moment as her fiancé is still living in the [place] area and is not scheduled to move here to help March, she recently moved, her kids both recently started new schools.  She did not feel like she had increased stress at the time but on obtaining history she also reports that these episodes did feel like previous panic attacks that she had.  She denied any chest pain, shortness of breath or palpitations with them.     Health Maintenance: Completed    Patient Active Problem List    Diagnosis Date Noted   • Anxiety 09/13/2019   • Breast lump in female 08/12/2019   • Acute pain of right shoulder 08/05/2019        Allergies:Dairy food allergy    Current  Outpatient Medications   Medication Sig Dispense Refill   • hydrOXYzine HCl (ATARAX) 50 MG Tab Take 1 Tab by mouth 3 times a day as needed for Itching. 30 Tab 0   • norethindrone-ethinyl estradiol (JUNEL FE 1/20) 1-20 MG-MCG per tablet Take 1 Tab by mouth every day. TAKE 1 TABLET BY MOUTH DAILY 28 Tab 1   • citalopram (CELEXA) 40 MG Tab Take 40 mg by mouth every day.     • buPROPion (WELLBUTRIN XL) 300 MG XL tablet Take 300 mg by mouth every morning.     • tizanidine (ZANAFLEX) 2 MG tablet Take 1 Tab by mouth 3 times a day. 30 Tab 0     No current facility-administered medications for this visit.        Social History     Tobacco Use   • Smoking status: Former Smoker   • Smokeless tobacco: Never Used   Substance Use Topics   • Alcohol use: Yes     Frequency: 2-4 times a month   • Drug use: No     Social History     Social History Narrative   • Not on file       Family History   Problem Relation Age of Onset   • Diabetes Mother    • Heart Disease Father    • No Known Problems Sister        ROS:     No fevers or weight loss  No headaches or sore throat  No chest pain or shortness of breath  No bowel changes  No lower extremity edema  No suicidal ideation or memory loss        EXAM:   /68 (BP Location: Right arm, Patient Position: Sitting, BP Cuff Size: Adult)   Pulse 62   Temp 36.3 °C (97.3 °F) (Temporal)   Resp 18   Ht 1.829 m (6')   Wt 89 kg (196 lb 3.2 oz)   SpO2 97%  Body mass index is 26.61 kg/m².    Constitutional: Well-developed and well-nourished. Not diaphoretic. No distress.   Skin: Skin is warm and dry. No rash noted.  Head: Atraumatic without lesions.  Neck: Supple, trachea midline. No thyromegaly present. No cervical or supraclavicular lymphadenopathy.  Cardiovascular: Regular rate and rhythm.   Chest: Effort normal. Clear to auscultation throughout. No adventitious sounds.   Abdomen: Soft, non tender, and without distention. Active bowel sounds in all four quadrants. No rebound, guarding,  masses or hepatosplenomegaly.  Extremities: No cyanosis, clubbing, erythema, nor edema.   Neurological: Alert and oriented x 3. Sensation intact.   Psychiatric:  Behavior, mood, and affect are appropriate.       ASSESSMENT/PLAN:    1. Anxiety  New problem uncontrolled.  We discussed a Return to 40 mg of Celexa until  Her life stressors are minimized.  At that time we can discuss weaning off the celexa.  She did not decide to wean for any real reason other than she thought she might not need it. Hydroxyzine PRN.   Patient works in ER. Advised to check blood sugar in the event of any other episodes to ensure she is not having hypoglycemic event.    - hydrOXYzine HCl (ATARAX) 50 MG Tab; Take 1 Tab by mouth 3 times a day as needed for Itching.  Dispense: 30 Tab; Refill: 0    Return in about 3 months (around 12/13/2019), or if symptoms worsen or fail to improve.       Please note that this dictation was created using voice recognition software. I have made every reasonable attempt to correct obvious errors, but I expect that there are errors of grammar and possibly content that I did not discover before finalizing the note.

## 2019-09-13 NOTE — ASSESSMENT & PLAN NOTE
There is a new to me, chronic problem for which patient has been seen by behavioral health and was started on Celexa several years ago.  She was prescribed 40 mg of Celexa and she is also on 300 mg of Wellbutrin daily.  She recently has had 2 episodes of what she believed were panic attacks.  She reports that she was at work with 1 of them and broke out with sweats, tremors and feeling very claustrophobic.  She also reported that afterwards she was very emotional and felt like she was very foggy for a while.  She was in the cafeteria at the time and felt like she would pass out if she tried to leave.  At first she thought that they might have been hypoglycemic events however upon obtaining history, patient did report that she decided to try to wean herself off the Celexa approximately 1 month ago and had cut her dose in half.  Previously she had not been having any panic attacks but does have a history of them.  She does not have anything for breakthrough anxiety and has not discussed this with her behavioral health provider.  She does also report increased stress in her life at the moment as her fiancé is still living in the [place] area and is not scheduled to move here to help March, she recently moved, her kids both recently started new schools.  She did not feel like she had increased stress at the time but on obtaining history she also reports that these episodes did feel like previous panic attacks that she had.  She denied any chest pain, shortness of breath or palpitations with them.

## 2019-09-17 ENCOUNTER — APPOINTMENT (OUTPATIENT)
Dept: PHYSICAL THERAPY | Facility: REHABILITATION | Age: 39
End: 2019-09-17
Attending: FAMILY MEDICINE
Payer: COMMERCIAL

## 2019-09-19 RX ORDER — BUPROPION HYDROCHLORIDE 300 MG/1
TABLET ORAL
Qty: 30 TAB | Refills: 6 | Status: SHIPPED | OUTPATIENT
Start: 2019-09-19 | End: 2019-12-17 | Stop reason: SDUPTHER

## 2019-09-19 NOTE — TELEPHONE ENCOUNTER
Was the patient seen in the last year in this department? Yes  lov 9/13/19  Does patient have an active prescription for medications requested? No     Received Request Via: Pharmacy

## 2019-09-23 ENCOUNTER — PHYSICAL THERAPY (OUTPATIENT)
Dept: PHYSICAL THERAPY | Facility: REHABILITATION | Age: 39
End: 2019-09-23
Attending: FAMILY MEDICINE
Payer: COMMERCIAL

## 2019-09-23 DIAGNOSIS — S46.911A STRAIN OF RIGHT SHOULDER, INITIAL ENCOUNTER: ICD-10-CM

## 2019-09-23 DIAGNOSIS — S16.1XXA STRAIN OF NECK MUSCLE, INITIAL ENCOUNTER: ICD-10-CM

## 2019-09-23 PROCEDURE — 97012 MECHANICAL TRACTION THERAPY: CPT

## 2019-09-23 PROCEDURE — 97140 MANUAL THERAPY 1/> REGIONS: CPT

## 2019-09-23 PROCEDURE — 97110 THERAPEUTIC EXERCISES: CPT

## 2019-09-23 NOTE — OP THERAPY DAILY TREATMENT
Outpatient Physical Therapy  DAILY TREATMENT     [facility] Outpatient Physical Therapy [place] [address] [address] [facility] [facility] [address], [address]  [place] [address]  Phone:  [phone]    Date: 09/23/2019    Patient: Caroline Grewal  YOB: 1980  MRN: [id number]     Time Calculation  Start time: 0831  Stop time: 0915 Time Calculation (min): 44 minutes       Chief Complaint: shoulder problem  Visit #: 9    SUBJECTIVE: Some days has no symptoms//been inconsistent with exercises secondary to increased anxiety.        OBJECTIVE:  Current objective measures:           Therapeutic Exercises (CPT 60911):     1. [place] with balloon cervical stab on wall, 4 x 30 sec, review only    2. Foam roller cervical stab with alt shoulder flexion, horizontal abduction, x 15 each    3. Foam roller pec stretch, 3 x 30 sec    4. Cervical retraction/extension seated, x 15    5. Thoracic extension in chair, x 15, review only    7. Foam roller orange tband horizontal abduction, x 20, review only    8. Foam roller box Low trap with orange tband resistance, x 15, review only    9. Sarhman wall slide with lift off, x 15    10. Wall chiqui, x 15    11. Ski jumper, 2 x 1 min    12. Ball plank , 2 x 1 min    Therapeutic Treatments and Modalities:     1. Mechanical Traction (CPT 80333), cervical 17/8 60/20 with MHP cervical x 15 min    2. Manual Therapy (CPT 84350), scapular mob SL right, cervical retraction mobilization seated    Time-based treatments/modalities:  Manual therapy minutes (CPT 20185): 8 minutes  Therapeutic exercise minutes (CPT 89723): 20 minutes       ASSESSMENT:   Response to treatment: demonstrating improved low trap recruitment and cervical endurance.  Patient is receiving help with anxiety issue.     PLAN/RECOMMENDATIONS:   Plan for treatment: therapy treatment to continue next visit.  Planned interventions for next visit: continue with current treatment.

## 2019-09-24 ENCOUNTER — IMMUNIZATION (OUTPATIENT)
Dept: OCCUPATIONAL MEDICINE | Facility: CLINIC | Age: 39
End: 2019-09-24

## 2019-09-24 DIAGNOSIS — Z23 NEED FOR VACCINATION: ICD-10-CM

## 2019-09-24 PROCEDURE — 90686 IIV4 VACC NO PRSV 0.5 ML IM: CPT | Performed by: NURSE PRACTITIONER

## 2019-09-28 ENCOUNTER — DOCUMENTATION (OUTPATIENT)
Dept: OCCUPATIONAL MEDICINE | Facility: CLINIC | Age: 39
End: 2019-09-28

## 2019-10-08 ENCOUNTER — TELEPHONE (OUTPATIENT)
Dept: PHYSICAL THERAPY | Facility: REHABILITATION | Age: 39
End: 2019-10-08

## 2019-10-08 NOTE — OP THERAPY DISCHARGE SUMMARY
Outpatient Physical Therapy  DISCHARGE SUMMARY NOTE      [facility] Physical Therapy [address], [address]  [place] [address]  Phone:  [phone]    Date of Visit: 10/08/2019    Patient: Caroline Grewal  YOB: 1980  MRN: [id number]     Referring Provider: Bob Villasenor M.D.   Referring Diagnosis Strain of neck muscle     Physical Therapy Occurrence Codes    Date of onset of impairment:  3/8/19   Date physical therapy care plan established or reviewed:  7/15/19   Date physical therapy treatment started:  7/15/19          Functional Assessment Used        Your patient is being discharged from Physical Therapy with the following comments:   · Patient cancelled or missed more than 2 scheduled appointments/non-compliant    Comments:  Patient has been seen for 10 visits with good progress but has had several late cancels and no shows.  Please see daily treatment notes for details.     Limitations Remaining:  Intermittent cervical and right shoulder pain  Weakness in right scapular stabilizer endurance and core weakness      Recommendations:  Discharge    Ute Davis PT, MSPT    Date: 10/8/2019

## 2019-10-13 DIAGNOSIS — Z30.41 ORAL CONTRACEPTIVE USE: ICD-10-CM

## 2019-10-13 DIAGNOSIS — Z76.0 MEDICATION REFILL: ICD-10-CM

## 2019-10-13 RX ORDER — NORETHINDRONE ACETATE AND ETHINYL ESTRADIOL AND FERROUS FUMARATE 1MG-20(21)
KIT ORAL
Qty: 28 TAB | Refills: 6 | Status: SHIPPED | OUTPATIENT
Start: 2019-10-13 | End: 2020-01-27 | Stop reason: SDUPTHER

## 2019-10-16 ENCOUNTER — EH NON-PROVIDER (OUTPATIENT)
Dept: OCCUPATIONAL MEDICINE | Facility: CLINIC | Age: 39
End: 2019-10-16

## 2019-10-16 DIAGNOSIS — Z02.89 ENCOUNTER FOR OCCUPATIONAL HEALTH EXAMINATION INVOLVING RESPIRATOR: ICD-10-CM

## 2019-10-16 PROCEDURE — 94375 RESPIRATORY FLOW VOLUME LOOP: CPT | Performed by: NURSE PRACTITIONER

## 2019-12-17 RX ORDER — BUPROPION HYDROCHLORIDE 300 MG/1
300 TABLET ORAL
Qty: 90 TAB | Refills: 2 | Status: SHIPPED | OUTPATIENT
Start: 2019-12-17 | End: 2020-01-27 | Stop reason: SDUPTHER

## 2020-01-27 DIAGNOSIS — Z76.0 MEDICATION REFILL: ICD-10-CM

## 2020-01-27 DIAGNOSIS — Z30.41 ORAL CONTRACEPTIVE USE: ICD-10-CM

## 2020-01-27 DIAGNOSIS — F41.9 ANXIETY: ICD-10-CM

## 2020-01-27 RX ORDER — HYDROXYZINE 50 MG/1
50 TABLET, FILM COATED ORAL 3 TIMES DAILY PRN
Qty: 30 TAB | Refills: 0 | Status: SHIPPED | OUTPATIENT
Start: 2020-01-27 | End: 2020-02-19 | Stop reason: SDUPTHER

## 2020-01-27 RX ORDER — BUPROPION HYDROCHLORIDE 300 MG/1
300 TABLET ORAL
Qty: 90 TAB | Refills: 2 | Status: SHIPPED | OUTPATIENT
Start: 2020-01-27 | End: 2020-06-30 | Stop reason: SDUPTHER

## 2020-01-27 RX ORDER — NORETHINDRONE ACETATE AND ETHINYL ESTRADIOL 1MG-20(21)
1 KIT ORAL
Qty: 28 TAB | Refills: 6 | Status: SHIPPED | OUTPATIENT
Start: 2020-01-27 | End: 2020-02-18

## 2020-01-29 ENCOUNTER — PHYSICAL THERAPY (OUTPATIENT)
Dept: PHYSICAL THERAPY | Facility: REHABILITATION | Age: 40
End: 2020-01-29
Attending: PHYSICAL MEDICINE & REHABILITATION
Payer: COMMERCIAL

## 2020-01-29 DIAGNOSIS — M54.12 RADICULOPATHY, CERVICAL REGION: ICD-10-CM

## 2020-01-29 DIAGNOSIS — M54.81 OCCIPITAL NEURALGIA, UNSPECIFIED LATERALITY: ICD-10-CM

## 2020-01-29 DIAGNOSIS — M25.511 RIGHT SHOULDER PAIN, UNSPECIFIED CHRONICITY: ICD-10-CM

## 2020-01-29 DIAGNOSIS — M53.82 OTHER SPECIFIED DORSOPATHIES, CERVICAL REGION: ICD-10-CM

## 2020-01-29 PROCEDURE — 97110 THERAPEUTIC EXERCISES: CPT

## 2020-01-29 PROCEDURE — 97161 PT EVAL LOW COMPLEX 20 MIN: CPT

## 2020-01-29 RX ORDER — CITALOPRAM 40 MG/1
40 TABLET ORAL DAILY
Qty: 90 TAB | Refills: 3 | Status: SHIPPED | OUTPATIENT
Start: 2020-01-29 | End: 2020-02-19 | Stop reason: SDUPTHER

## 2020-01-29 ASSESSMENT — ENCOUNTER SYMPTOMS
PAIN SCALE AT HIGHEST: 7
QUALITY: STABBING
PAIN SCALE: 3
QUALITY: BURNING
PAIN SCALE AT LOWEST: 0
QUALITY: SHARP

## 2020-01-29 NOTE — TELEPHONE ENCOUNTER
Received request via: Pharmacy    Was the patient seen in the last year in this department? Yes9/13/2019    Does the patient have an active prescription (recently filled or refills available) for medication(s) requested? No
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
## 2020-02-03 ENCOUNTER — APPOINTMENT (OUTPATIENT)
Dept: PHYSICAL THERAPY | Facility: REHABILITATION | Age: 40
End: 2020-02-03
Attending: PHYSICAL MEDICINE & REHABILITATION
Payer: COMMERCIAL

## 2020-02-04 NOTE — OP THERAPY DAILY TREATMENT
Outpatient Physical Therapy  DAILY TREATMENT     [facility] Outpatient Physical Therapy [place]  [address] [place] [address], [address]  [facility] [address]  Phone:  [phone]  Fax:  [phone]    Date: 02/05/2020    Patient: Caroline Grewal  YOB: 1980  MRN: [id number]     Time Calculation  Start time: 0900  Stop time: 0930 Time Calculation (min): 30 minutes       Chief Complaint: neck/shoulder pain  Visit #: 2    SUBJECTIVE:  No pain reported thus far today. Some pain with work reported but no activity limited    OBJECTIVE:  Current objective measures: cervical AROM WFL and non painful, shoulder AROM WNL/WFL and non painful          Therapeutic Exercises (CPT 61276):     1. UBE, 3min lvl 10    2. Banded rows    3. Trx rows, x20    4. DNF flexion in supine holds, 5sec x 10    5. TRX Ys, x 10    6. Banded ER, next visit    7. Prone lower trap, scap retraction, next visit      Therapeutic Exercise Summary: Discussion on change in position during work activities. Avoiding sitting for longer than an hour when able, standing rest breaks for 10-30 seconds or so.       Time-based treatments/modalities:  Therapeutic exercise minutes (CPT 88803): 30 minutes       Pain rating before treatment:   Pain rating after treatment:     ASSESSMENT:   Response to treatment: Pt doing well and able to complete all exercises without increased or reported pain. Exercises and intensity to increase as able next visit. DNF endurance to progress.     PLAN/RECOMMENDATIONS:   Plan for treatment: therapy treatment to continue next visit.  Planned interventions for next visit: continue with current treatment.

## 2020-02-05 ENCOUNTER — PHYSICAL THERAPY (OUTPATIENT)
Dept: PHYSICAL THERAPY | Facility: REHABILITATION | Age: 40
End: 2020-02-05
Attending: PHYSICAL MEDICINE & REHABILITATION
Payer: COMMERCIAL

## 2020-02-05 DIAGNOSIS — M25.511 RIGHT SHOULDER PAIN, UNSPECIFIED CHRONICITY: ICD-10-CM

## 2020-02-05 DIAGNOSIS — M54.12 RADICULOPATHY, CERVICAL REGION: ICD-10-CM

## 2020-02-05 DIAGNOSIS — M54.81 OCCIPITAL NEURALGIA, UNSPECIFIED LATERALITY: ICD-10-CM

## 2020-02-05 DIAGNOSIS — M53.82 OTHER SPECIFIED DORSOPATHIES, CERVICAL REGION: ICD-10-CM

## 2020-02-05 PROCEDURE — 97110 THERAPEUTIC EXERCISES: CPT

## 2020-02-10 ENCOUNTER — APPOINTMENT (OUTPATIENT)
Dept: PHYSICAL THERAPY | Facility: REHABILITATION | Age: 40
End: 2020-02-10
Attending: PHYSICAL MEDICINE & REHABILITATION
Payer: COMMERCIAL

## 2020-02-11 ENCOUNTER — PHYSICAL THERAPY (OUTPATIENT)
Dept: PHYSICAL THERAPY | Facility: REHABILITATION | Age: 40
End: 2020-02-11
Attending: PHYSICAL MEDICINE & REHABILITATION
Payer: COMMERCIAL

## 2020-02-11 DIAGNOSIS — M54.12 RADICULOPATHY, CERVICAL REGION: ICD-10-CM

## 2020-02-11 PROCEDURE — 97110 THERAPEUTIC EXERCISES: CPT

## 2020-02-11 NOTE — OP THERAPY DAILY TREATMENT
Outpatient Physical Therapy  DAILY TREATMENT     [facility] Outpatient Physical Therapy [place]  [address], [address]  [facility] [address]  Phone:  [phone]  Fax:  [phone]    Date: 02/11/2020    Patient: Caroline Grewal  YOB: 1980  MRN: [id number]     Time Calculation  Start time: 0730  Stop time: 0800 Time Calculation (min): 30 minutes       Chief Complaint: neck/shoulder pain  Visit #: 3    SUBJECTIVE:  No pain reported thus far today. Except for constant neck pain that is on her left side. Again some pain with work reported but no activity limited. Going to [employer] later today.      OBJECTIVE:  Current objective measures: cervical AROM WFL and non painful, shoulder AROM WNL/WFL and non painful          Therapeutic Exercises (CPT 95895):     1. Supine foam roller series, x 25, green band x 15 each    2. ER band, red x 20 each    3. Trx rows, x20    4. DNF flexion in supine holds, 5sec x 10    5. TRX Ys, x 10    6. Serratus push up, x 12    7. Prone lower trap, scap retraction, 2# x 12, 1# x 12      Time-based treatments/modalities:  Therapeutic exercise minutes (CPT 77261): 30 minutes       Pain rating before treatment:   Pain rating after treatment:     ASSESSMENT:   Response to treatment: Pt doing well and able to complete all exercises without reported pain. At end of session pt reported constant pain along left side of neck that was not previously reported during session. Next visit to focus on changing said pain.     PLAN/RECOMMENDATIONS:   Plan for treatment: therapy treatment to continue next visit.  Planned interventions for next visit: continue with current treatment.

## 2020-02-12 ENCOUNTER — APPOINTMENT (OUTPATIENT)
Dept: PHYSICAL THERAPY | Facility: REHABILITATION | Age: 40
End: 2020-02-12
Attending: PHYSICAL MEDICINE & REHABILITATION
Payer: COMMERCIAL

## 2020-02-14 DIAGNOSIS — Z30.41 ORAL CONTRACEPTIVE USE: ICD-10-CM

## 2020-02-14 DIAGNOSIS — Z76.0 MEDICATION REFILL: ICD-10-CM

## 2020-02-18 ENCOUNTER — PHYSICAL THERAPY (OUTPATIENT)
Dept: PHYSICAL THERAPY | Facility: REHABILITATION | Age: 40
End: 2020-02-18
Attending: PHYSICAL MEDICINE & REHABILITATION
Payer: COMMERCIAL

## 2020-02-18 DIAGNOSIS — M54.12 RADICULOPATHY, CERVICAL REGION: ICD-10-CM

## 2020-02-18 DIAGNOSIS — M53.82 OTHER SPECIFIED DORSOPATHIES, CERVICAL REGION: ICD-10-CM

## 2020-02-18 PROCEDURE — 97110 THERAPEUTIC EXERCISES: CPT

## 2020-02-18 RX ORDER — NORETHINDRONE ACETATE AND ETHINYL ESTRADIOL AND FERROUS FUMARATE 1MG-20(21)
KIT ORAL
Qty: 84 TAB | Refills: 3 | Status: SHIPPED | OUTPATIENT
Start: 2020-02-18 | End: 2021-02-22 | Stop reason: SDUPTHER

## 2020-02-18 NOTE — OP THERAPY DAILY TREATMENT
Outpatient Physical Therapy  DAILY TREATMENT     [facility] Outpatient Physical Therapy [place]  [address], [address]  [facility] [address]  Phone:  [phone]  Fax:  [phone]    Date: 02/18/2020    Patient: Caroline Grewal  YOB: 1980  MRN: [id number]     Time Calculation  Start time: 1330  Stop time: 1400 Time Calculation (min): 30 minutes       Chief Complaint: neck/shoulder pain  Visit #: 4    SUBJECTIVE:  No pain reported thus far today. States that neck pain on left side comes and goes. No specific activities or movements bring on symptoms.     OBJECTIVE:  Current objective measures: cervical AROM WFL and non painful, shoulder AROM WNL/WFL and non painful          Therapeutic Exercises (CPT 04112):     1. Prone ball back extension/ neck isometrics, 30 sec x 3    2. ER band, red x 20 each    3. Trx rows, x20    4. DNF flexion in supine holds, 5sec x 10    5. TRX Ys, x 10    6. Serratus push up, x 12    7. Prone lower trap, scap retraction, 2# x 12, 1# x 12      Time-based treatments/modalities:  Therapeutic exercise minutes (CPT 61036): 30 minutes       Pain rating before treatment:   Pain rating after treatment:     ASSESSMENT:   Response to treatment: pain not provoked with treatment. Symptoms inconsistent visit to visit. Promotion to independence of HEP within 2-3 visits.     PLAN/RECOMMENDATIONS:   Plan for treatment: therapy treatment to continue next visit.  Planned interventions for next visit: continue with current treatment.

## 2020-02-19 DIAGNOSIS — F41.9 ANXIETY: ICD-10-CM

## 2020-02-19 RX ORDER — CITALOPRAM 40 MG/1
40 TABLET ORAL DAILY
Qty: 90 TAB | Refills: 3 | Status: SHIPPED | OUTPATIENT
Start: 2020-02-19 | End: 2020-06-30

## 2020-02-19 RX ORDER — HYDROXYZINE 50 MG/1
50 TABLET, FILM COATED ORAL 3 TIMES DAILY PRN
Qty: 30 TAB | Refills: 0 | Status: SHIPPED | OUTPATIENT
Start: 2020-02-19 | End: 2020-06-04

## 2020-02-19 NOTE — TELEPHONE ENCOUNTER
Received request via: Pharmacy    Was the patient seen in the last year in this department? Yes  9/13/19  Does the patient have an active prescription (recently filled or refills available) for medication(s) requested? No

## 2020-02-24 ENCOUNTER — APPOINTMENT (OUTPATIENT)
Dept: PHYSICAL THERAPY | Facility: REHABILITATION | Age: 40
End: 2020-02-24
Attending: PHYSICAL MEDICINE & REHABILITATION
Payer: COMMERCIAL

## 2020-02-26 ENCOUNTER — PHYSICAL THERAPY (OUTPATIENT)
Dept: PHYSICAL THERAPY | Facility: REHABILITATION | Age: 40
End: 2020-02-26
Attending: PHYSICAL MEDICINE & REHABILITATION
Payer: COMMERCIAL

## 2020-02-26 DIAGNOSIS — M53.82 OTHER SPECIFIED DORSOPATHIES, CERVICAL REGION: ICD-10-CM

## 2020-02-26 DIAGNOSIS — M54.12 RADICULOPATHY, CERVICAL REGION: ICD-10-CM

## 2020-02-26 PROCEDURE — 97110 THERAPEUTIC EXERCISES: CPT

## 2020-02-26 NOTE — OP THERAPY DAILY TREATMENT
Outpatient Physical Therapy  DAILY TREATMENT     [facility] Outpatient Physical Therapy [place]  [address], [address]  [facility] [address]  Phone:  [phone]  Fax:  [phone]    Date: 02/26/2020    Patient: Caroline Grewal  YOB: 1980  MRN: [id number]     Time Calculation  Start time: 1030  Stop time: 1100 Time Calculation (min): 30 minutes       Chief Complaint: neck/shoulder pain  Visit #: 5      SUBJECTIVE:  Feels that pain is worse after therapy for that day. Feels that the exercises are bringing down the pain (less intense overall). Has hot yoga/pilates tonight..      OBJECTIVE:  Current objective measures: cervical AROM WFL and non painful, shoulder AROM WNL/WFL and non painful          Therapeutic Exercises (CPT 42250):     1. Prone ball back extension/ neck isometrics, 30 sec x 3    2. Sidelying ER , 2# x2x15    3. Trx rows, x20    4. DNF flexion in supine holds, 5sec x 10    5. TRX Ys, x 10    6. Serratus push up, x 12    7. Prone lower trap, scap retraction, 2# x 12, 1# x 12    8. Prone prolonged cervical extension, 2-3min to HEP on Saturday.        Time-based treatments/modalities:  Therapeutic exercise minutes (CPT 89898): 30 minutes       Pain rating before treatment: 0-2  Pain rating after treatment: 0-2    ASSESSMENT:   Response to treatment: pain not provoked with treatment. Pt to trial cervical extension 1-2 times a day starting on Saturday. F/u in 2 weeks. D/C within 2 visits.      PLAN/RECOMMENDATIONS:   Plan for treatment: therapy treatment to continue next visit.  Planned interventions for next visit: continue with current treatment.

## 2020-03-11 ENCOUNTER — PHYSICAL THERAPY (OUTPATIENT)
Dept: PHYSICAL THERAPY | Facility: REHABILITATION | Age: 40
End: 2020-03-11
Attending: PHYSICAL MEDICINE & REHABILITATION
Payer: COMMERCIAL

## 2020-03-11 DIAGNOSIS — M54.81 OCCIPITAL NEURALGIA, UNSPECIFIED LATERALITY: ICD-10-CM

## 2020-03-11 DIAGNOSIS — M53.82 OTHER SPECIFIED DORSOPATHIES, CERVICAL REGION: ICD-10-CM

## 2020-03-11 DIAGNOSIS — M54.12 RADICULOPATHY, CERVICAL REGION: ICD-10-CM

## 2020-03-11 PROCEDURE — 97110 THERAPEUTIC EXERCISES: CPT

## 2020-03-11 NOTE — OP THERAPY DAILY TREATMENT
Outpatient Physical Therapy  DAILY TREATMENT     [facility] Outpatient Physical Therapy [place]  [address], [address]  [facility] [address]  Phone:  [phone]  Fax:  [phone]    Date: 03/11/2020    Patient: Carolien Grewal  YOB: 1980  MRN: [id number]     Time Calculation  Start time: 0330  Stop time: 0353 Time Calculation (min): 23 minutes       Chief Complaint: neck/shoulder pain  Visit #: 6      SUBJECTIVE:  Pt reports feeling much better with new exercises and has no pain currently. She states that at times she still gets pain at work but is doing the stretches as she can at work and feeling better.     OBJECTIVE:  Current objective measures: cervical AROM WFL and non painful, shoulder AROM WNL/WFL and non painful  PT Functional Assessment Tool Used: NDI, DASH  PT Functional Assessment Score: NDI: 8%, DASH: 0.83%         Therapeutic Exercises (CPT 34967):     1. Prone ball back extension/ neck isometrics, 30 sec x 3    2. Sidelying ER , 2# x2x15    3. Trx rows, x20    4. DNF flexion in supine holds, 5sec x 10    5. Prone neck extension , x2min     6. Cervical extension isometrics, 10 sec x 10       Time-based treatments/modalities:  Therapeutic exercise minutes (CPT 07181): 23 minutes       Pain rating before treatment: 0  Pain rating after treatment: 0    ASSESSMENT:   Response to treatment: overall goals met and D/C is appropriate. Pt has methods of controlling pain and will continue to progress on her own with her HEP.        PLAN/RECOMMENDATIONS:   Plan for treatment: therapy treatment to continue next visit.  Planned interventions for next visit: continue with current treatment.

## 2020-03-12 NOTE — OP THERAPY DISCHARGE SUMMARY
Outpatient Physical Therapy  DISCHARGE SUMMARY NOTE      [facility] Physical Therapy [place]  [address]  [place] [address]  Phone:  [phone]  Fax:  [phone]    Date of Visit: 03/11/2020    Patient: Caroline Grewal  YOB: 1980  MRN: [id number]     Referring Provider: Christiano Yusuf M.D.  [address] [place] [address]   Referring Diagnosis Radiculopathy, cervical region [M54.12];Other specified dorsopathies, cervical region [M53.82];Occipital neuralgia [M54.81];Pain in right shoulder [M25.511]     Physical Therapy Occurrence Codes    Date physical therapy care plan established or reviewed:  1/29/20   Date physical therapy treatment started:  1/29/20          Functional Assessment Used  PT Functional Assessment Tool Used: NDI, DASH  PT Functional Assessment Score: NDI: 8%, DASH: 0.83%       Your patient is being discharged from Physical Therapy with the following comments:   · Goals met    Comments:  Caroline Grewal has completed 6 physical therapy sessions on her current prescription. She has improved function, decreased pain, improved strength, consistent ROM, and she continues to progress with her home exercise program. Recommend to discharge patient to full independent home exercise program at this time. Thank you for the opportunity to assist you and your patient.       Limitations Remaining:  none    Recommendations:  Continue HEP    John Vega, PT, DPT    Date: 3/11/2020

## 2020-03-18 ENCOUNTER — APPOINTMENT (OUTPATIENT)
Dept: PHYSICAL THERAPY | Facility: REHABILITATION | Age: 40
End: 2020-03-18
Attending: PHYSICAL MEDICINE & REHABILITATION
Payer: COMMERCIAL

## 2020-06-04 ENCOUNTER — OFFICE VISIT (OUTPATIENT)
Dept: MEDICAL GROUP | Facility: PHYSICIAN GROUP | Age: 40
End: 2020-06-04
Payer: COMMERCIAL

## 2020-06-04 VITALS
DIASTOLIC BLOOD PRESSURE: 72 MMHG | BODY MASS INDEX: 26.82 KG/M2 | RESPIRATION RATE: 16 BRPM | WEIGHT: 198 LBS | TEMPERATURE: 98.5 F | SYSTOLIC BLOOD PRESSURE: 110 MMHG | OXYGEN SATURATION: 95 % | HEIGHT: 72 IN | HEART RATE: 79 BPM

## 2020-06-04 DIAGNOSIS — I83.891 VARICOSE VEINS OF RIGHT LOWER EXTREMITY WITH OTHER COMPLICATIONS: ICD-10-CM

## 2020-06-04 DIAGNOSIS — F41.9 ANXIETY: ICD-10-CM

## 2020-06-04 DIAGNOSIS — R79.9 ABNORMAL BLOOD CHEMISTRY: ICD-10-CM

## 2020-06-04 DIAGNOSIS — E78.2 MIXED HYPERLIPIDEMIA: ICD-10-CM

## 2020-06-04 DIAGNOSIS — M25.511 ACUTE PAIN OF RIGHT SHOULDER: ICD-10-CM

## 2020-06-04 DIAGNOSIS — Z00.00 GENERAL MEDICAL EXAM: ICD-10-CM

## 2020-06-04 PROCEDURE — 99214 OFFICE O/P EST MOD 30 MIN: CPT | Performed by: PHYSICIAN ASSISTANT

## 2020-06-04 SDOH — HEALTH STABILITY: MENTAL HEALTH: HOW MANY STANDARD DRINKS CONTAINING ALCOHOL DO YOU HAVE ON A TYPICAL DAY?: 1 OR 2

## 2020-06-04 ASSESSMENT — PATIENT HEALTH QUESTIONNAIRE - PHQ9
CLINICAL INTERPRETATION OF PHQ2 SCORE: 2
5. POOR APPETITE OR OVEREATING: 3 - NEARLY EVERY DAY
SUM OF ALL RESPONSES TO PHQ QUESTIONS 1-9: 11

## 2020-06-04 ASSESSMENT — FIBROSIS 4 INDEX: FIB4 SCORE: 0.64
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
## 2020-06-04 NOTE — ASSESSMENT & PLAN NOTE
GFR on labs from 09/19 was 57, will recheck. No history of same, She drinks plenty of water. Update labs.

## 2020-06-04 NOTE — ASSESSMENT & PLAN NOTE
"Patient history of anxiety. Last fall she tried to taper down on the Celexa, but started to have panic attacks, so she went back on 40 mg, and she continues to take Wellbutrin 300 mg XL. She cut back on caffeine to try to help reduce it. 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 Before she was prescribed hydroxyzine- but this \"knocks her out\". Still generalized, hits her on a day to day basis. Denies any SI/HI.   "

## 2020-06-04 NOTE — ASSESSMENT & PLAN NOTE
Blood work from August 2018 was reviewed today.  Upon reviewing her healthy tracks screen her total cholesterol is 219, triglycerides 145, HDL 57, .  She will be due to check these labs again.  She is currently not on any medications.

## 2020-06-30 ENCOUNTER — TELEMEDICINE (OUTPATIENT)
Dept: BEHAVIORAL HEALTH | Facility: CLINIC | Age: 40
End: 2020-06-30
Payer: COMMERCIAL

## 2020-06-30 ENCOUNTER — APPOINTMENT (OUTPATIENT)
Dept: BEHAVIORAL HEALTH | Facility: CLINIC | Age: 40
End: 2020-06-30
Payer: COMMERCIAL

## 2020-06-30 ENCOUNTER — PATIENT MESSAGE (OUTPATIENT)
Dept: BEHAVIORAL HEALTH | Facility: CLINIC | Age: 40
End: 2020-06-30

## 2020-06-30 VITALS — WEIGHT: 195 LBS | BODY MASS INDEX: 26.41 KG/M2 | HEIGHT: 72 IN

## 2020-06-30 DIAGNOSIS — F41.1 GENERALIZED ANXIETY DISORDER: ICD-10-CM

## 2020-06-30 DIAGNOSIS — F33.1 MODERATE RECURRENT MAJOR DEPRESSION (HCC): ICD-10-CM

## 2020-06-30 PROCEDURE — 90792 PSYCH DIAG EVAL W/MED SRVCS: CPT | Performed by: PSYCHIATRY & NEUROLOGY

## 2020-06-30 RX ORDER — BUPROPION HYDROCHLORIDE 300 MG/1
300 TABLET ORAL
Qty: 90 TAB | Refills: 2 | Status: SHIPPED | OUTPATIENT
Start: 2020-06-30 | End: 2020-11-11 | Stop reason: SDUPTHER

## 2020-06-30 RX ORDER — SERTRALINE HYDROCHLORIDE 100 MG/1
TABLET, FILM COATED ORAL
Qty: 180 TAB | Refills: 1 | Status: SHIPPED | OUTPATIENT
Start: 2020-06-30 | End: 2020-11-05 | Stop reason: SDUPTHER

## 2020-06-30 ASSESSMENT — PATIENT HEALTH QUESTIONNAIRE - PHQ9
5. POOR APPETITE OR OVEREATING: 1 - SEVERAL DAYS
SUM OF ALL RESPONSES TO PHQ QUESTIONS 1-9: 7
CLINICAL INTERPRETATION OF PHQ2 SCORE: 2

## 2020-06-30 ASSESSMENT — FIBROSIS 4 INDEX: FIB4 SCORE: 0.64

## 2020-06-30 NOTE — PROGRESS NOTES
[facility] INITIAL PSYCHIATRIC EVALUATION    This provider informed the patient their medical records are totally confidential except for the use by other providers involved in their care, or if the patient signs a release, or to report instances of child or elder abuse, or if it is determined they are an immediate risk to harm themselves or others.  To avoid spread of covid-19 virus this appointment was conducted by telehealth.  The patient gave consent to have this evaluation by video telehealth.    Time at beginning of call:  02:00 PM    TOTAL FACE-TO-FACE TIME 60 minutes    CHIEF COMPLAINT       Having depressed mood, generalized anxiety, and panic attacks.    IDENTIFYING INFORMATION       The patient is a  40yo W female who works as a  in the ED at [employer].    HISTORY OF PRESENT ILLNESS       The patient has had worsening Major Depression since age 13yo and exacerbated by her divorce was finalized in 2015 and she got shared custody of her 2 daughters.  Her episodes of depression have been characterized by a typical pattern of anhedonia, hypersomnia (she sleeps 10 to 12 hours/day) and variable appetite, markedly decreased energy, concentration, interest, and motivation, and having moderate psychomotor retardation.  She denies ever having a suicidal plan, intent, impulse, or attempt except when she overdosed on medication when she was 21yo.         She also has had Generalized Anxiety Disorder with Panic Attacks characterized by excessive worry and concern that she can't get out of her mind, feeling tense and on edge, irritability, muscle tension in her neck, shoulders, and upper back, getting so anxious at times that her mind goes blank, and being unable to turn off her worries at night so she can get to sleep.  She also has panic attacks characterized by the sudden onset of 20 to 30 min of SOB, palpitations, chest tightness, shaking,  sweating,3 to 4 times a month  tingling, dizziness, chills, hot flashes, and fear of loss of control.       She has had more anxiety because of the corona virus pandemic.  She gets very critical of herself and is not able to have fun with her daughters.  She has a mother who has depression and her sister is probably bipolar.       She was followed by mental health at [facility] in [place] from 2014 to 2016 and was started on citalopram 40mg po daily and bupropion XL 300mg po daily.  She doesn't feel her anxiety is well controlled at the present time.           PSYCHIATRIC REVIEW OF SYSTEMS         The patient denies having manic, psychotic, obsessive compulsive, eating disorder, or trauma symptoms.  For review if depressive and anxiety symptoms see the HPI.    MEDICAL REVIEW OF SYSTEMS:   Constitutional negative   Eyes negative   Ears/Nose/Mouth/Throat negative   Cardiovascular negative   Respiratory positive - breast lump   Gastrointestinal positive - GERD   Genitourinary negative   Muscular negative   Integumentary positive - (R) shoulder pain   Neurological negative   Endocrine positive - hyperlipidemia   Hematologic/Lymphatic positive - varicose veins (R) lower extremity       PAST PSYCHIATRIC HISTORY       Recurrent Major Depression and Generalized Anxiety Disorder with Panic Attacks.  She denies ever having OCD, manic or hypomanic episodes.  PAST PSYCHIATRIC MEDICATIONS       Citalopram, bupropion XL, sertraline, hydroxyzine  SOCIAL HISTORY       Born and raised in [place], [place] and was  and  and remarried her best friend.  Her  works for [employer] and will start working in [employer].    DRUGS none  ALCOHOL occasional alcohol  TOBACCO former smoker    MEDICAL HISTORY       Breast lump, GERD, (R) shoulder pain, varicose veins (R) lower extremity, hyperlipidemia  CURRENT MEDICATIONS       Citalopram 40mg po daily.       Bupropion XL 300mg po daily.  ALLERGIES       Dairy food  MENTAL  "STATUS EXAMINATION    Ht 1.854 m (6' 1\")   Wt 88.5 kg (195 lb)   LMP  (LMP Unknown)   Breastfeeding No   BMI 25.73 kg/m²   Participation: Active verbal participation  Grooming:Neat  Orientation: Fully Oriented  Eye contact: Good  Behavior:Calm   Mood: Anxious  Affect: Constricted  Thought process: Logical  Thought content:  Within normal limits  Speech: Rate within normal limits and Volume within normal limits  Perception:  Within normal limits  Memory:  No gross evidence of memory deficits  Insight: Good  Judgment: Good  Family/couple interaction observations:   Other:    CURRENT RISK       Suicidal:Low       Homicidal:Not applicable       Self-Harm:Low       Relapse:Moderate       Crisis Safety Plan Reviewed Not Indicated    ASSESSMENT       Having alleviation of depression but not enough suppression of anxiety by citalopram 40mg po daily.  It will be tapered and  discontinued and the patient will be started on a trial of sertraline 100mg po daily X 1 week, then increased to 150mg po daily X 1 week, then increased to 200mg po daily thereafter.  She will be continued on bupropion XL 300mg po daily for energy, drive, and motivation.  DIFFERENTIAL DIAGNOSES      (1) Major Depression, Recurrent, Moderate (F33.1)      (2) Generalized Anxiety Disorder with Panic Attacks (F41.1)  PLAN       Decrease citalopram dosage to 20mg po daily X 1 week, then discontinue.       Start sertraline 100mg po daily X 1 week, then increase to 150mg po daily X 1 week, then increase to 200mg po daily thereafter.       Continue bupropion XL 300mg po daily.       RTC to  Clinic in 2 months for 30 min follow up with this provider.    Time at end of call:  03:00 PM    Patient was seen for 60 minutes face to face of which > 50% of appointment time was spent on counseling and coordination of care regarding the above.         "

## 2020-07-10 ENCOUNTER — HOSPITAL ENCOUNTER (OUTPATIENT)
Dept: LAB | Facility: MEDICAL CENTER | Age: 40
End: 2020-07-10
Payer: COMMERCIAL

## 2020-07-10 ENCOUNTER — HOSPITAL ENCOUNTER (OUTPATIENT)
Dept: LAB | Facility: MEDICAL CENTER | Age: 40
End: 2020-07-10
Attending: PHYSICIAN ASSISTANT
Payer: COMMERCIAL

## 2020-07-10 DIAGNOSIS — Z00.00 GENERAL MEDICAL EXAM: ICD-10-CM

## 2020-07-10 DIAGNOSIS — E87.1 HYPONATREMIA: ICD-10-CM

## 2020-07-10 LAB
25(OH)D3 SERPL-MCNC: 42 NG/ML (ref 30–100)
ALBUMIN SERPL BCP-MCNC: 4.2 G/DL (ref 3.2–4.9)
ALBUMIN/GLOB SERPL: 1.4 G/DL
ALP SERPL-CCNC: 52 U/L (ref 30–99)
ALT SERPL-CCNC: 15 U/L (ref 2–50)
ANION GAP SERPL CALC-SCNC: 9 MMOL/L (ref 7–16)
AST SERPL-CCNC: 11 U/L (ref 12–45)
BASOPHILS # BLD AUTO: 1 % (ref 0–1.8)
BASOPHILS # BLD: 0.08 K/UL (ref 0–0.12)
BDY FAT % MEASURED: 28.2 %
BILIRUB SERPL-MCNC: 0.2 MG/DL (ref 0.1–1.5)
BP DIAS: 63 MMHG
BP SYS: 110 MMHG
BUN SERPL-MCNC: 15 MG/DL (ref 8–22)
CALCIUM SERPL-MCNC: 9.5 MG/DL (ref 8.5–10.5)
CHLORIDE SERPL-SCNC: 99 MMOL/L (ref 96–112)
CHOLEST SERPL-MCNC: 217 MG/DL (ref 100–199)
CHOLEST SERPL-MCNC: 221 MG/DL (ref 100–199)
CO2 SERPL-SCNC: 23 MMOL/L (ref 20–33)
CREAT SERPL-MCNC: 0.82 MG/DL (ref 0.5–1.4)
DIABETES HTDIA: NO
EOSINOPHIL # BLD AUTO: 0.43 K/UL (ref 0–0.51)
EOSINOPHIL NFR BLD: 5.2 % (ref 0–6.9)
ERYTHROCYTE [DISTWIDTH] IN BLOOD BY AUTOMATED COUNT: 43.9 FL (ref 35.9–50)
EVENT NAME HTEVT: NORMAL
FASTING HTFAS: YES
FASTING STATUS PATIENT QL REPORTED: NORMAL
GLOBULIN SER CALC-MCNC: 3.1 G/DL (ref 1.9–3.5)
GLUCOSE SERPL-MCNC: 88 MG/DL (ref 65–99)
GLUCOSE SERPL-MCNC: 91 MG/DL (ref 65–99)
HCT VFR BLD AUTO: 45.1 % (ref 37–47)
HDLC SERPL-MCNC: 50 MG/DL
HDLC SERPL-MCNC: 51 MG/DL
HGB BLD-MCNC: 14.3 G/DL (ref 12–16)
HYPERTENSION HTHYP: NO
IMM GRANULOCYTES # BLD AUTO: 0.02 K/UL (ref 0–0.11)
IMM GRANULOCYTES NFR BLD AUTO: 0.2 % (ref 0–0.9)
LDLC SERPL CALC-MCNC: 128 MG/DL
LDLC SERPL CALC-MCNC: 132 MG/DL
LYMPHOCYTES # BLD AUTO: 2.33 K/UL (ref 1–4.8)
LYMPHOCYTES NFR BLD: 28.3 % (ref 22–41)
MCH RBC QN AUTO: 30 PG (ref 27–33)
MCHC RBC AUTO-ENTMCNC: 31.7 G/DL (ref 33.6–35)
MCV RBC AUTO: 94.5 FL (ref 81.4–97.8)
MONOCYTES # BLD AUTO: 0.72 K/UL (ref 0–0.85)
MONOCYTES NFR BLD AUTO: 8.7 % (ref 0–13.4)
NEUTROPHILS # BLD AUTO: 4.65 K/UL (ref 2–7.15)
NEUTROPHILS NFR BLD: 56.6 % (ref 44–72)
NRBC # BLD AUTO: 0 K/UL
NRBC BLD-RTO: 0 /100 WBC
PLATELET # BLD AUTO: 303 K/UL (ref 164–446)
PMV BLD AUTO: 9.7 FL (ref 9–12.9)
POTASSIUM SERPL-SCNC: 4.4 MMOL/L (ref 3.6–5.5)
PROT SERPL-MCNC: 7.3 G/DL (ref 6–8.2)
RBC # BLD AUTO: 4.77 M/UL (ref 4.2–5.4)
SCREENING LOC CITY HTCIT: NORMAL
SCREENING LOC STATE HTSTA: NORMAL
SCREENING LOCATION HTLOC: NORMAL
SMOKING HTSMO: NO
SODIUM SERPL-SCNC: 131 MMOL/L (ref 135–145)
SUBSCRIBER ID HTSID: NORMAL
TRIGL SERPL-MCNC: 192 MG/DL (ref 0–149)
TRIGL SERPL-MCNC: 194 MG/DL (ref 0–149)
TSH SERPL DL<=0.005 MIU/L-ACNC: 1.93 UIU/ML (ref 0.38–5.33)
WBC # BLD AUTO: 8.2 K/UL (ref 4.8–10.8)

## 2020-07-10 PROCEDURE — 80053 COMPREHEN METABOLIC PANEL: CPT

## 2020-07-10 PROCEDURE — 80061 LIPID PANEL: CPT | Mod: 91

## 2020-07-10 PROCEDURE — 84443 ASSAY THYROID STIM HORMONE: CPT

## 2020-07-10 PROCEDURE — 36415 COLL VENOUS BLD VENIPUNCTURE: CPT

## 2020-07-10 PROCEDURE — S5190 WELLNESS ASSESSMENT BY NONPH: HCPCS

## 2020-07-10 PROCEDURE — 80061 LIPID PANEL: CPT

## 2020-07-10 PROCEDURE — 82947 ASSAY GLUCOSE BLOOD QUANT: CPT

## 2020-07-10 PROCEDURE — 82306 VITAMIN D 25 HYDROXY: CPT

## 2020-07-10 PROCEDURE — 85025 COMPLETE CBC W/AUTO DIFF WBC: CPT

## 2020-07-13 ENCOUNTER — PATIENT MESSAGE (OUTPATIENT)
Dept: MEDICAL GROUP | Facility: PHYSICIAN GROUP | Age: 40
End: 2020-07-13

## 2020-07-13 ENCOUNTER — TELEPHONE (OUTPATIENT)
Dept: MEDICAL GROUP | Facility: PHYSICIAN GROUP | Age: 40
End: 2020-07-13

## 2020-07-13 NOTE — TELEPHONE ENCOUNTER
----- Message from Catarina Gregorio P.A.-C. sent at 7/10/2020  2:04 PM PDT -----  Please call patient about their results.     Results showed:     Previous GFR from labs August 13, 2018 was 57.  Repeat lab within normal limits greater than 60.  Creatinine within normal limits.  Rest of CMP unremarkable other than a slightly low sodium.  Increase salt intake slightly. Repeat sodium in 4-6 weeks to ensure normalization. Non-fasting lab. I will write order.     Your cholesterol is elevated. I recommend decreasing your intake of saturated fats which are found in meats that come from a cow or pig. Saturated fats are also found in creams, cheeses, butter, mayonnaise, and fried foods. I recommend that you try to eat more vegetables, fruits, fish, and healthy oils like olive oil. Increase fiber intake to 35 grams or more a day. If you do not eat a lot of fiber currently, increase fiber slowly over weeks to reduce bloating and abdominal discomfort. I also recommend moderate intensity exercise like a brisk walk. This exercise should last at least 30 minutes and occur 5 or more days per week.     Thank you,    Catarina Gregorio PA-C

## 2020-07-13 NOTE — TELEPHONE ENCOUNTER
----- Message from Catarina Gregorio P.A.-C. sent at 7/10/2020  2:02 PM PDT -----  Please call patient about their results.     Results showed:     Your cholesterol is elevated. I recommend decreasing your intake of saturated fats which are found in meats that come from a cow or pig. Saturated fats are also found in creams, cheeses, butter, mayonnaise, and fried foods. I recommend that you try to eat more vegetables, fruits, fish, and healthy oils like olive oil. Increase fiber intake to 35 grams or more a day. If you do not eat a lot of fiber currently, increase fiber slowly over weeks to reduce bloating and abdominal discomfort. I also recommend moderate intensity exercise like a brisk walk. This exercise should last at least 30 minutes and occur 5 or more days per week.     We will discuss your labs in detail at your next follow-up visit.    Thank you,    Catarina Gregorio PA-C

## 2020-08-18 PROBLEM — N63.0 BREAST LUMP IN FEMALE: Status: RESOLVED | Noted: 2019-08-12 | Resolved: 2020-08-18

## 2020-08-18 PROBLEM — M25.511 ACUTE PAIN OF RIGHT SHOULDER: Status: RESOLVED | Noted: 2019-08-05 | Resolved: 2020-08-18

## 2020-08-18 NOTE — ASSESSMENT & PLAN NOTE
This is a chronic condition.   Latest Labs:   Lab Results   Component Value Date/Time    CHOLSTRLTOT 217 (H) 07/10/2020 08:46 AM     (H) 07/10/2020 08:46 AM    HDL 50 07/10/2020 08:46 AM    TRIGLYCERIDE 194 (H) 07/10/2020 08:46 AM      Medications: none currently    Diet/Exercise: She is working on lifestyle modifications.  Family History of high cholesterol or heart disease? Yes- dad MI, paternal gma stent placed.

## 2020-08-18 NOTE — ASSESSMENT & PLAN NOTE
Repeat labs on July 10, 2020 showed a normal GFR and creatinine level.  Suspect dehydration on previous labs.

## 2020-08-18 NOTE — ASSESSMENT & PLAN NOTE
Patient saw behavioral health on June 30, 2020.  She was not having adequate control of her anxiety with citalopram 40 mg.  They decided to taper off this medication and trial sertraline 100 mg daily.  After 1 week and she would increase to 250 mg daily.  After another week should increase to 200 mg daily thereafter.  They continued her Wellbutrin  mg daily.

## 2020-08-18 NOTE — ASSESSMENT & PLAN NOTE
Behavioral health stopped her citalopram and started her on a weaning regimen up of sertraline up to 200 mg daily.  She continued her bupropion 300 mg daily.

## 2020-08-20 ENCOUNTER — TELEMEDICINE (OUTPATIENT)
Dept: MEDICAL GROUP | Facility: PHYSICIAN GROUP | Age: 40
End: 2020-08-20
Payer: COMMERCIAL

## 2020-08-20 VITALS — RESPIRATION RATE: 12 BRPM | BODY MASS INDEX: 26.41 KG/M2 | HEIGHT: 72 IN | WEIGHT: 195 LBS

## 2020-08-20 DIAGNOSIS — F41.9 ANXIETY: ICD-10-CM

## 2020-08-20 DIAGNOSIS — F33.1 MODERATE RECURRENT MAJOR DEPRESSION (HCC): ICD-10-CM

## 2020-08-20 DIAGNOSIS — R05.9 COUGH: ICD-10-CM

## 2020-08-20 DIAGNOSIS — E78.2 MIXED HYPERLIPIDEMIA: ICD-10-CM

## 2020-08-20 DIAGNOSIS — R79.9 ABNORMAL BLOOD CHEMISTRY: ICD-10-CM

## 2020-08-20 PROCEDURE — 99214 OFFICE O/P EST MOD 30 MIN: CPT | Mod: 95,CR | Performed by: PHYSICIAN ASSISTANT

## 2020-08-20 RX ORDER — ALBUTEROL SULFATE 90 UG/1
2 AEROSOL, METERED RESPIRATORY (INHALATION) EVERY 4 HOURS PRN
Qty: 1 EACH | Refills: 1 | Status: SHIPPED | OUTPATIENT
Start: 2020-08-20 | End: 2020-10-05

## 2020-08-20 ASSESSMENT — FIBROSIS 4 INDEX: FIB4 SCORE: 0.35

## 2020-08-20 NOTE — ASSESSMENT & PLAN NOTE
This is a new condition. Initially started as respiratory infection she thinks, but cough has not resolved.   Onset: end of February beginning of March  Duration: intermittent, after she eats it seems to be worse. Sometimes has heartburn symptoms, not regularly.   Character: mostly dry, sometimes a wet cough.   Alleviating or Aggravating factors: Eating, laughing make it worse, mucinex does seem to help.   Treatments: mucinex.     No wheezing. No fever or chills.  No nasal congestion.

## 2020-08-20 NOTE — PROGRESS NOTES
Telemedicine Visit: Established Patient     This Remote Face to Face encounter was conducted via Zoom. Given the importance of social distancing and other strategies recommended to reduce the risk of COVID-19 transmission, I am providing medical care to this patient via audio/video visit in place of an in person visit at the request of the patient. Verbal consent to telehealth, risks, benefits, and consequences were discussed. Patient retains the right to withdraw at any time. All existing confidentiality protections apply. The patient has access to all transmitted medical information. No dissemination of any patient images or information to other entities without further written consent.    CHIEF COMPLAINT     Chief Complaint   Patient presents with   • Cough     x 6 months       HPI  Caroline Grewal is a 39 y.o. female who presents today for the following    Moderate recurrent major depression (HCC)  Patient saw behavioral health on June 30, 2020.  She was not having adequate control of her anxiety with citalopram 40 mg.  They decided to taper off this medication and trial sertraline 100 mg daily.  After 1 week and she would increase to 250 mg daily.  After another week should increase to 200 mg daily thereafter.  They continued her Wellbutrin  mg daily.    Anxiety  Behavioral health stopped her citalopram and started her on a weaning regimen up of sertraline up to 200 mg daily.  She continued her bupropion 300 mg daily.    Mixed hyperlipidemia  This is a chronic condition.   Latest Labs:   Lab Results   Component Value Date/Time    CHOLSTRLTOT 217 (H) 07/10/2020 08:46 AM     (H) 07/10/2020 08:46 AM    HDL 50 07/10/2020 08:46 AM    TRIGLYCERIDE 194 (H) 07/10/2020 08:46 AM      Medications: none currently    Diet/Exercise: She is working on lifestyle modifications.  Family History of high cholesterol or heart disease? Yes- dad MI, paternal gma stent placed.       Abnormal blood chemistry  Repeat  labs on July 10, 2020 showed a normal GFR and creatinine level.  Suspect dehydration on previous labs.    Cough  This is a new condition. Initially started as respiratory infection she thinks, but cough has not resolved.   Onset: end of February beginning of March  Duration: intermittent, after she eats it seems to be worse. Sometimes has heartburn symptoms, not regularly.   Character: mostly dry, sometimes a wet cough.   Alleviating or Aggravating factors: Eating, laughing make it worse, mucinex does seem to help.   Treatments: mucinex.     No wheezing. No fever or chills.  No nasal congestion.       Reviewed PMH, PSH, FH, SH, ALL, HCM/IMM, no changes  Reviewed MEDS, see HPI changes to her SSRI.    Patient Active Problem List    Diagnosis Date Noted   • Cough 08/20/2020   • Moderate recurrent major depression (HCC) 06/30/2020   • Generalized anxiety disorder 06/30/2020   • Abnormal blood chemistry 06/04/2020   • Mixed hyperlipidemia 06/04/2020   • Anxiety 09/13/2019     CURRENT MEDICATIONS  Current Outpatient Medications   Medication Sig Dispense Refill   • albuterol 108 (90 Base) MCG/ACT Aero Soln inhalation aerosol Inhale 2 Puffs by mouth every four hours as needed for Shortness of Breath. 1 Each 1   • buPROPion (WELLBUTRIN XL) 300 MG XL tablet Take 1 Tab by mouth every day. 90 Tab 2   • sertraline (ZOLOFT) 100 MG Tab Take 1 tab po daily X 1 week, then take 1 and a 1/2 tabs po daily X 1 week, then take 2 tabs po daily thereafter. 180 Tab 1   • JUNEL FE 1/20 1-20 MG-MCG per tablet TAKE 1 TABLET BY MOUTH DAILY 84 Tab 3     No current facility-administered medications for this visit.      ALLERGIES  Allergies: Dairy food allergy  PAST MEDICAL HISTORY  Past Medical History:   Diagnosis Date   • Anxiety    • Depression    • GERD (gastroesophageal reflux disease)      SURGICAL HISTORY  She  has a past surgical history that includes abdominal exploration.  SOCIAL HISTORY  Social History     Tobacco Use   • Smoking  "status: Former Smoker     Types: Cigarettes     Quit date:      Years since quittin.6   • Smokeless tobacco: Never Used   Substance Use Topics   • Alcohol use: Yes     Frequency: 2-4 times a month     Drinks per session: 1 or 2   • Drug use: No     Social History     Social History Narrative   • Not on file     FAMILY HISTORY  Family History   Problem Relation Age of Onset   • Diabetes Mother    • Heart Disease Father         MI   • No Known Problems Sister      Family Status   Relation Name Status   • Mo  Alive   • Fa  Alive   • Sis  Alive       ROS   Constitutional: Negative for fever, chills, fatigue.  HENT: Negative for congestion, sore throat.  Eyes: Negative for vision problems.   Respiratory: Negative for shortness of breath.  Patient complains of cough for 6 months.  Cardiovascular: Negative for chest pain, palpitations.   Gastrointestinal: Negative for heartburn, nausea, abdominal pain.   Genitourinary: Negative for dysuria.  Musculoskeletal: Negative for significant myalgia, back and joint pain.   Skin: Negative for rash.   Neuro: Negative for dizziness, weakness and headaches.   Endo/Heme/Allergies: Does not bruise/bleed easily.   Psychiatric/Behavioral: Negative for depression.    Objective   Vitals obtained by patient:  Resp 12   Ht 1.854 m (6' 1\")   Wt 88.5 kg (195 lb)   BMI 25.73 kg/m²     Physical Exam:  Constitutional: Alert, no distress, well-groomed.  Skin: No rashes in visible areas.  Eye: Round. Conjunctiva clear, lids normal. No icterus.   ENMT: Lips pink without lesions, good dentition, moist mucous membranes. Phonation normal.  Neck: No masses. Moves freely without pain.  Respiratory: Unlabored respiratory effort, no cough or audible wheeze  Psych: Alert and oriented x3, normal affect and mood.   Labs     Labs are reviewed and discussed with a patient  Lab Results   Component Value Date/Time    CHOLSTRLTOT 217 (H) 07/10/2020 08:46 AM     (H) 07/10/2020 08:46 AM    HDL 50 " 07/10/2020 08:46 AM    TRIGLYCERIDE 194 (H) 07/10/2020 08:46 AM       Lab Results   Component Value Date/Time    SODIUM 131 (L) 07/10/2020 08:46 AM    POTASSIUM 4.4 07/10/2020 08:46 AM    CHLORIDE 99 07/10/2020 08:46 AM    CO2 23 07/10/2020 08:46 AM    GLUCOSE 91 07/10/2020 08:46 AM    BUN 15 07/10/2020 08:46 AM    CREATININE 0.82 07/10/2020 08:46 AM     Lab Results   Component Value Date/Time    ALKPHOSPHAT 52 07/10/2020 08:46 AM    ASTSGOT 11 (L) 07/10/2020 08:46 AM    ALTSGPT 15 07/10/2020 08:46 AM    TBILIRUBIN 0.2 07/10/2020 08:46 AM      No results found for: HBA1C  No results found for: TSH  No results found for: FREET4    Lab Results   Component Value Date/Time    WBC 8.2 07/10/2020 08:46 AM    RBC 4.77 07/10/2020 08:46 AM    HEMOGLOBIN 14.3 07/10/2020 08:46 AM    HEMATOCRIT 45.1 07/10/2020 08:46 AM    MCV 94.5 07/10/2020 08:46 AM    MCH 30.0 07/10/2020 08:46 AM    MCHC 31.7 (L) 07/10/2020 08:46 AM    MPV 9.7 07/10/2020 08:46 AM    NEUTSPOLYS 56.60 07/10/2020 08:46 AM    LYMPHOCYTES 28.30 07/10/2020 08:46 AM    MONOCYTES 8.70 07/10/2020 08:46 AM    EOSINOPHILS 5.20 07/10/2020 08:46 AM    BASOPHILS 1.00 07/10/2020 08:46 AM        Imaging      None    Assessment and Plan   The following treatment plan was discussed:     1. Moderate recurrent major depression (HCC)  2. Anxiety  Patient seen behavioral health.  Now off citalopram and on sertraline she has weaned up to 200 mg.  She continues to be on Wellbutrin  mg daily.  Her goal is to increase her sertraline to 250 mg daily.  3. Mixed hyperlipidemia  - Lipid Profile; Future  At this point the patient would like to continue lifestyle modifications.  She has made small modifications in her diet to try to improve cholesterol.  Has started taking fish oil as well.  Discussed eating at least 35 g of fiber a day.  She works out at a minimum 3 times a week if not more.  She does have a family history of heart issues, her father had an MI and a paternal  grandmother with a stent placement.    4. Abnormal blood chemistry  Resolved kidney function.  Suspect dehydration on previous labs.  5. Cough  - DX-CHEST-2 VIEWS; Future  - COVID/SARS CoV-2 PCR; Future  Differential diagnosis would include: Post viral cough, bronchitis, reactive airway disease, irritant inhalation from the smoking, atypical reflux, does not seem likely to be allergic rhinitis no allergy symptoms at this time.  No sinusitis symptoms.  No shortness of breath or chest pain.  We are going to proceed with a chest x-ray, and like her to try albuterol as needed for the coughing spells.  I also like to get a COVID-19 PCR testing.  She works in the [employer].  Recommend self quarantine until we get the test back.  She understands and will call and report to her manager at work.  Other orders  - albuterol 108 (90 Base) MCG/ACT Aero Soln inhalation aerosol; Inhale 2 Puffs by mouth every four hours as needed for Shortness of Breath.  Dispense: 1 Each; Refill: 1        Follow-up: Return in about 3 months (around 11/20/2020) for Follow up on labs.    Face to Face Video Visit:   The patient verbalized agreement and understanding of current plan. All questions and concerns were addressed at time of visit.    Please note that this dictation was created using voice recognition software. I have made every reasonable attempt to correct obvious errors, but I expect that there are errors of grammar and possibly content that I did not discover before finalizing the note.

## 2020-08-21 ENCOUNTER — TELEPHONE (OUTPATIENT)
Dept: MEDICAL GROUP | Facility: PHYSICIAN GROUP | Age: 40
End: 2020-08-21

## 2020-08-21 ENCOUNTER — HOSPITAL ENCOUNTER (OUTPATIENT)
Dept: LAB | Facility: MEDICAL CENTER | Age: 40
End: 2020-08-21
Attending: PHYSICIAN ASSISTANT
Payer: COMMERCIAL

## 2020-08-21 ENCOUNTER — HOSPITAL ENCOUNTER (OUTPATIENT)
Dept: RADIOLOGY | Facility: MEDICAL CENTER | Age: 40
End: 2020-08-21
Attending: PHYSICIAN ASSISTANT
Payer: COMMERCIAL

## 2020-08-21 DIAGNOSIS — R05.9 COUGH: ICD-10-CM

## 2020-08-21 LAB
COVID ORDER STATUS COVID19: NORMAL
SARS-COV-2 RNA RESP QL NAA+PROBE: NOTDETECTED
SPECIMEN SOURCE: NORMAL

## 2020-08-21 PROCEDURE — 71046 X-RAY EXAM CHEST 2 VIEWS: CPT

## 2020-08-21 PROCEDURE — C9803 HOPD COVID-19 SPEC COLLECT: HCPCS

## 2020-08-21 PROCEDURE — U0003 INFECTIOUS AGENT DETECTION BY NUCLEIC ACID (DNA OR RNA); SEVERE ACUTE RESPIRATORY SYNDROME CORONAVIRUS 2 (SARS-COV-2) (CORONAVIRUS DISEASE [COVID-19]), AMPLIFIED PROBE TECHNIQUE, MAKING USE OF HIGH THROUGHPUT TECHNOLOGIES AS DESCRIBED BY CMS-2020-01-R: HCPCS

## 2020-08-21 NOTE — TELEPHONE ENCOUNTER
----- Message from Catarina Gregorio P.A.-C. sent at 8/21/2020 12:31 PM PDT -----  Please call patient about their results.     Results showed: Good news chest x-ray is negative.    If you have any questions or concerns, do not hesitate to contact me or my Medical Assistant. Thank you for your time today.     Respectfully,     Catarina Gregorio PA-C

## 2020-08-24 ENCOUNTER — TELEPHONE (OUTPATIENT)
Dept: MEDICAL GROUP | Facility: PHYSICIAN GROUP | Age: 40
End: 2020-08-24

## 2020-08-24 NOTE — TELEPHONE ENCOUNTER
----- Message from Catarina Gregorio P.A.-C. sent at 8/24/2020 11:04 AM PDT -----  Please call patient about their results.     Results showed: Good news COVID-19 PCR testing result: Not detected.    Thank you,    Catarina Gregorio PA-C

## 2020-10-05 RX ORDER — ALBUTEROL SULFATE 90 UG/1
2 AEROSOL, METERED RESPIRATORY (INHALATION) EVERY 4 HOURS PRN
Qty: 1 EACH | Refills: 5 | Status: SHIPPED | OUTPATIENT
Start: 2020-10-05 | End: 2024-02-06

## 2020-10-16 ENCOUNTER — PATIENT MESSAGE (OUTPATIENT)
Dept: BEHAVIORAL HEALTH | Facility: CLINIC | Age: 40
End: 2020-10-16

## 2020-10-16 ENCOUNTER — TELEMEDICINE (OUTPATIENT)
Dept: MEDICAL GROUP | Facility: PHYSICIAN GROUP | Age: 40
End: 2020-10-16
Payer: COMMERCIAL

## 2020-10-16 VITALS — WEIGHT: 195 LBS | RESPIRATION RATE: 12 BRPM | HEIGHT: 72 IN | BODY MASS INDEX: 26.41 KG/M2

## 2020-10-16 DIAGNOSIS — R05.9 COUGH: ICD-10-CM

## 2020-10-16 DIAGNOSIS — E78.2 MIXED HYPERLIPIDEMIA: ICD-10-CM

## 2020-10-16 DIAGNOSIS — F50.2 BULIMIA NERVOSA, PURGING TYPE: ICD-10-CM

## 2020-10-16 DIAGNOSIS — R79.9 ABNORMAL BLOOD CHEMISTRY: ICD-10-CM

## 2020-10-16 DIAGNOSIS — J34.2 DEVIATED SEPTUM: ICD-10-CM

## 2020-10-16 PROBLEM — F50.20 BULIMIA NERVOSA, PURGING TYPE: Status: ACTIVE | Noted: 2020-10-16

## 2020-10-16 PROCEDURE — 99213 OFFICE O/P EST LOW 20 MIN: CPT | Mod: 95,CR | Performed by: PHYSICIAN ASSISTANT

## 2020-10-16 ASSESSMENT — FIBROSIS 4 INDEX: FIB4 SCORE: 0.35

## 2020-10-16 NOTE — ASSESSMENT & PLAN NOTE
Dentist said she could have deviated septum. Sometimes she thinks she has a harder time breathing through her nose. Would like referral to ENT.  History of broken nose in the past.

## 2020-10-16 NOTE — ASSESSMENT & PLAN NOTE
Due for repeat labs, at her last visit we discussed lifestyle modifications try to reduce her cholesterol.

## 2020-10-16 NOTE — ASSESSMENT & PLAN NOTE
At her last visit patient was complaining of a chronic intermittent cough for the last 6 months.  We did proceed with a chest x-ray which was negative.  Covid testing was negative.  No acute findings on her labs.  Did recommend that she try albuterol inhaler as needed.  Today patient reports: Cough is overall improved.

## 2020-10-16 NOTE — PROGRESS NOTES
Virtual Visit: Established Patient   This visit was conducted via Zoom using secure and encrypted videoconferencing technology. The patient was in a private location in the state of [place].    The patient's identity was confirmed and verbal consent was obtained for this virtual visit.    Subjective:   CC:   Chief Complaint   Patient presents with   • Referral Needed     ENT   • Eating Disorder       Caroline Grewal is a 39 y.o. female presenting for evaluation and management of:      Cough  At her last visit patient was complaining of a chronic intermittent cough for the last 6 months.  We did proceed with a chest x-ray which was negative.  Covid testing was negative.  No acute findings on her labs.  Did recommend that she try albuterol inhaler as needed.  Today patient reports: Cough is overall improved.    Abnormal blood chemistry  Due for repeat labs, recommend patient is adequately hydrated before next set of labs.    Mixed hyperlipidemia  Due for repeat labs, at her last visit we discussed lifestyle modifications try to reduce her cholesterol.    Deviated septum  Dentist said she could have deviated septum. Sometimes she thinks she has a harder time breathing through her nose. Would like referral to ENT.  History of broken nose in the past.    Bulimia nervosa, purging type  Discussed with patient today a chronic recurrent history of bulimia.  Patient has a history of binging and purging, since adolescent years.  Recently has become more problematic again.  She would like to seek treatment for this.  She is currently on bupropion 30 mg XL daily and Zoloft 100 mg daily.  We discussed today treatment plan, discussed proceeding with CBT and referral to behavioral health on this matter.      ROS   Denies any recent fevers or chills. No nausea or vomiting. No chest pains or shortness of breath.     Allergies   Allergen Reactions   • Dairy Food Allergy Rash     Pt states hands and legs       Current medicines  "(including changes today)  Current Outpatient Medications   Medication Sig Dispense Refill   • albuterol 108 (90 Base) MCG/ACT Aero Soln inhalation aerosol INHALE 2 PUFFS BY MOUTH EVERY FOUR HOURS AS NEEDED FOR SHORTNESS OF BREATH. 1 Each 5   • buPROPion (WELLBUTRIN XL) 300 MG XL tablet Take 1 Tab by mouth every day. 90 Tab 2   • sertraline (ZOLOFT) 100 MG Tab Take 1 tab po daily X 1 week, then take 1 and a 1/2 tabs po daily X 1 week, then take 2 tabs po daily thereafter. 180 Tab 1   • JUNEL FE 1/20 1-20 MG-MCG per tablet TAKE 1 TABLET BY MOUTH DAILY 84 Tab 3     No current facility-administered medications for this visit.        Patient Active Problem List    Diagnosis Date Noted   • Deviated septum 10/16/2020   • Bulimia nervosa, purging type 10/16/2020   • Cough 08/20/2020   • Moderate recurrent major depression (HCC) 06/30/2020   • Generalized anxiety disorder 06/30/2020   • Abnormal blood chemistry 06/04/2020   • Mixed hyperlipidemia 06/04/2020   • Anxiety 09/13/2019       Family History   Problem Relation Age of Onset   • Diabetes Mother    • Heart Disease Father         MI   • No Known Problems Sister        She  has a past medical history of Anxiety, Depression, and GERD (gastroesophageal reflux disease).  She  has a past surgical history that includes abdominal exploration.       Objective:   Resp 12   Ht 1.854 m (6' 1\")   Wt 88.5 kg (195 lb)   BMI 25.73 kg/m²     Physical Exam:  Constitutional: Alert, no distress, well-groomed.  Tearful.  Skin: No rashes in visible areas.  Eye: Round. Conjunctiva clear, lids normal. No icterus.   ENMT: Lips pink without lesions, good dentition, moist mucous membranes. Phonation normal.  Neck: No masses, no thyromegaly. Moves freely without pain.  Respiratory: Unlabored respiratory effort, no cough or audible wheeze  Psych: Alert and oriented x3, normal affect and mood.       Assessment and Plan:   The following treatment plan was discussed:     1. Cough  Overall " improving, patient has been conscientious of her eating habits, could be contributing factor.  Reassured her that lab test, chest x-ray were unremarkable and normal.  No signs of infectious process.  Could be a gastritis or reflux association.  2. Abnormal blood chemistry  Due for repeat you BUN in December.  3. Mixed hyperlipidemia  Due for repeat cholesterol December.  4. Deviated septum  Patient has history of a broken nose.  Having difficulty breathing through her nose.  Specially with the mass.  Would like referral to ENT    5. Bulimia nervosa, purging type  Had a honest discussion with the patient today about purging and binging.  Difficult for the patient to bring up, she has not talked about this with anybody in the past.  But she does want to actively seek help.  Once again she has a history of purging and binging since her adolescent years, recently over the last year has become more problematic.  Discussed this could be contributing factor to her chronic cough, could be a lower esophageal inflammation.  Because the patient is currently on bupropion 300 mg XL and the sertraline 100 mg daily, recommend referral to behavioral health for CBT on this condition.  She is agreeable to this plan.    Other orders  - REFERRAL TO ENDOCRINOLOGY  - REFERRAL TO BEHAVIORAL HEALTH        Follow-up: Return for Follow-up in December as scheduled..        The patient verbalized agreement and understanding of current plan. All questions and concerns were addressed at time of visit.    Please note that this dictation was created using voice recognition software. I have made every reasonable attempt to correct obvious errors, but I expect that there are errors of grammar and possibly content that I did not discover before finalizing the note.

## 2020-10-22 PROCEDURE — 90686 IIV4 VACC NO PRSV 0.5 ML IM: CPT | Performed by: NURSE PRACTITIONER

## 2020-10-23 ENCOUNTER — NON-PROVIDER VISIT (OUTPATIENT)
Dept: OCCUPATIONAL MEDICINE | Facility: CLINIC | Age: 40
End: 2020-10-23

## 2020-10-23 DIAGNOSIS — Z23 NEED FOR VACCINATION: ICD-10-CM

## 2020-11-04 ENCOUNTER — PATIENT MESSAGE (OUTPATIENT)
Dept: BEHAVIORAL HEALTH | Facility: CLINIC | Age: 40
End: 2020-11-04

## 2020-11-05 RX ORDER — NALTREXONE HYDROCHLORIDE 50 MG/1
50 TABLET, FILM COATED ORAL DAILY
Qty: 90 TAB | Refills: 0 | Status: SHIPPED | OUTPATIENT
Start: 2020-11-05 | End: 2020-11-11 | Stop reason: SDUPTHER

## 2020-11-05 RX ORDER — SERTRALINE HYDROCHLORIDE 100 MG/1
200 TABLET, FILM COATED ORAL DAILY
Qty: 180 TAB | Refills: 0 | Status: SHIPPED | OUTPATIENT
Start: 2020-11-05 | End: 2020-11-11 | Stop reason: SDUPTHER

## 2020-11-11 ENCOUNTER — TELEMEDICINE (OUTPATIENT)
Dept: BEHAVIORAL HEALTH | Facility: CLINIC | Age: 40
End: 2020-11-11
Payer: COMMERCIAL

## 2020-11-11 DIAGNOSIS — F50.2 BULIMIA NERVOSA, PURGING TYPE: ICD-10-CM

## 2020-11-11 DIAGNOSIS — F33.1 MODERATE RECURRENT MAJOR DEPRESSION (HCC): ICD-10-CM

## 2020-11-11 DIAGNOSIS — F41.1 GENERALIZED ANXIETY DISORDER: ICD-10-CM

## 2020-11-11 PROCEDURE — 90833 PSYTX W PT W E/M 30 MIN: CPT | Mod: 95,CR | Performed by: PSYCHIATRY & NEUROLOGY

## 2020-11-11 PROCEDURE — 99213 OFFICE O/P EST LOW 20 MIN: CPT | Mod: 95,CR | Performed by: PSYCHIATRY & NEUROLOGY

## 2020-11-11 RX ORDER — BUPROPION HYDROCHLORIDE 300 MG/1
300 TABLET ORAL
Qty: 90 TAB | Refills: 2 | Status: SHIPPED | OUTPATIENT
Start: 2020-11-11 | End: 2021-01-25 | Stop reason: SDUPTHER

## 2020-11-11 RX ORDER — SERTRALINE HYDROCHLORIDE 100 MG/1
200 TABLET, FILM COATED ORAL DAILY
Qty: 180 TAB | Refills: 0 | Status: SHIPPED | OUTPATIENT
Start: 2020-11-11 | End: 2020-11-11

## 2020-11-11 RX ORDER — SERTRALINE HYDROCHLORIDE 100 MG/1
200 TABLET, FILM COATED ORAL DAILY
Qty: 180 TAB | Refills: 0 | Status: SHIPPED | OUTPATIENT
Start: 2020-11-11 | End: 2021-01-25 | Stop reason: SDUPTHER

## 2020-11-11 RX ORDER — NALTREXONE HYDROCHLORIDE 50 MG/1
50 TABLET, FILM COATED ORAL DAILY
Qty: 90 TAB | Refills: 0 | Status: SHIPPED | OUTPATIENT
Start: 2020-11-11 | End: 2021-01-25 | Stop reason: SDUPTHER

## 2020-11-11 NOTE — PROGRESS NOTES
"                                  [facility] PSYCHIATRIC FOLLOW-UP NOTE    This provider informed the patient their medical records are totally confidential except for the use by other providers involved in their care, or if the patient signs a release, or to report instances of child or elder abuse, or if it is determined they are an immediate risk to harm themselves or others.  To avoid spread of covid-19 virus this appointment was conducted by telehealth.  The patient gave consent to have this follow up session by video telehealth.    Time at beginning of call:  02:30 PM    TOTAL FACE-TO-FACE TIME  30 minutes    CHIEF COMPLAINT       Having depression, generalized anxiety, and panic attacks.  HISTORY OF PRESENT ILLNESS       The patient is a  39yo W female and ED  who is followed by this provider for recurrent Major Depression and Generalized Anxiety Disorder with Panic Attacks.  She has had depressive episodes since she was 13yo which were exacerbated when her divorce was finalized in 2015 and she got shared custody of her 2 daughters.  Her depressive episodes have an atypical pattern of anhedonia, hypersomnia, variable appetite, and moderate psychomotor retardation.  She denies ever having an overt suicidal plan, intent, or attempt.  She is struggling emotionally and feels sad or angry and is very critical about herself.       She has generalized anxiety disorder with panic attacks with restlessness, excessive worry and concern, irritability, muscle tension in her neck, shoulders, and upper back, and occasionally she gets so anxious that her mind goes blank.  She has panic attacks 3 to 4 times a month.       She is self-critical and wishes she could have more fun with her daughters.  She has better alleviation of depression and anxiety on sertraline 200mg po daily combined with bupropion XL 300mg po daily.  However, she has been struggling lately with some \"unhealthy behaviors\" " (binging/purging, picking).  She will be started on a trial of naltrexone 50mg po daily to help curb these behaviors.  She has struggled with bulimic symptoms since she was 17yo.         Her  has been able to move to [place] and she sees her 18yo daughter more often.  She is starting to exercise and it helps her manage to keep herself together.   PSYCHOSOCIAL CHANGES SINCE PREVIOUS CONTACT       Having more unhealthy behaviors such as binging and purging.  RESPONSE TO TREATMENT       Having partial alleviation of depression by bupropion XL 300mg po daily and sertraline 200mg po daily.  PAST PSYCHIATRIC MEDICATIONS       Citalopram, bupropion XL, sertraline.  MEDICATION SIDE EFFECTS       None    MEDICAL REVIEW OF SYSTEMS:   Constitutional negative   Eyes negative   Ears/Nose/Mouth/Throat negative   Cardiovascular negative   Respiratory positive - breast lump   Gastrointestinal positive - GERD   Genitourinary negative   Muscular negative   Integumentary positive - (R) shoulder pain   Neurological negative   Endocrine positive - hyperlipidemia   Hematologic/Lymphatic positive - varicose veins (R) lower extremity        MENTAL STATUS EVALUATION  There were no vitals taken for this visit.  Participation: Active verbal participation  Grooming:Neat  Orientation: Fully Oriented  Eye contact: Good  Behavior:Calm   Mood: Depressed  Affect: Constricted  Thought process: Logical  Thought content:  Within normal limits  Speech: Rate within normal limits and Volume within normal limits  Perception:  Within normal limits  Memory:  No gross evidence of memory deficits  Insight: Good  Judgment: Good  Family/couple interaction observations:   Other:  Depression Screen (PHQ-2/PHQ-9) 8/5/2019 6/4/2020 6/30/2020   PHQ-2 Total Score 0 2 2   PHQ-9 Total Score - 11 7       Interpretation of PHQ-9 Total Score   Score Severity   1-4 No Depression   5-9 Mild Depression   10-14 Moderate Depression   15-19 Moderately Severe Depression  "  20-27 Severe Depression  Current risk:    Suicide: Low   Homicide: Not applicable   Self-harm: Low  Relapse: Moderate  Other:   Crisis Safety Plan reviewed?No  If evidence of imminent risk is present, intervention/plan:    Medical Records/Labs/Diagnostic Tests Reviewed: yes    Medical Records/Labs/Diagnostic Tests Ordered: no    DIAGNOSTIC IMPRESSIONS       (1) Major Depression, Recurrent, Moderate (F33.1)       (2) Generalized Anxiety Disorder with Panic Attacks (F41.1)       (3) Bulimia Nervosa (F50.2)    ASSESSMENT AND PLAN       Her binging and purging are compulsive behaviors under increased stress and low self esteem.  Her sertraline is 200mg po daily which should act against compulsive behaviors and she will be started on naltrexone 50mg po daily to reduce craving.       Start naltrexone 50mg po daily.       Continue sertraline 200mg po daily.       Continue bupropion XL 300mg po daily.       RTC to  Clinic in 2 months for 30 min follow up with this provider.    Time at end of call:  03:00 PM    30 minutes spent in psychotherapy  Topics addressed in psychotherapy include:  The patient's compulsive binging is really a \"depressive equivalent\".  How her guilt over her weight gain leads to purging and a vicious [place] of binging/purging.  Discussed how dissociation allows her to treat her body as an object.          "

## 2020-11-24 ENCOUNTER — HOSPITAL ENCOUNTER (OUTPATIENT)
Facility: MEDICAL CENTER | Age: 40
End: 2020-11-24
Attending: PREVENTIVE MEDICINE
Payer: COMMERCIAL

## 2020-11-24 ENCOUNTER — EH NON-PROVIDER (OUTPATIENT)
Dept: OCCUPATIONAL MEDICINE | Facility: CLINIC | Age: 40
End: 2020-11-24

## 2020-11-24 DIAGNOSIS — Z11.59 ENCOUNTER FOR SCREENING FOR OTHER VIRAL DISEASES: ICD-10-CM

## 2020-11-24 PROCEDURE — U0003 INFECTIOUS AGENT DETECTION BY NUCLEIC ACID (DNA OR RNA); SEVERE ACUTE RESPIRATORY SYNDROME CORONAVIRUS 2 (SARS-COV-2) (CORONAVIRUS DISEASE [COVID-19]), AMPLIFIED PROBE TECHNIQUE, MAKING USE OF HIGH THROUGHPUT TECHNOLOGIES AS DESCRIBED BY CMS-2020-01-R: HCPCS | Performed by: PREVENTIVE MEDICINE

## 2020-11-25 DIAGNOSIS — Z11.59 ENCOUNTER FOR SCREENING FOR OTHER VIRAL DISEASES: ICD-10-CM

## 2020-11-25 LAB — COVID ORDER STATUS COVID19: NORMAL

## 2020-11-26 LAB
SARS-COV-2 RNA RESP QL NAA+PROBE: DETECTED
SPECIMEN SOURCE: ABNORMAL

## 2020-11-30 ENCOUNTER — TELEPHONE (OUTPATIENT)
Dept: OCCUPATIONAL MEDICINE | Facility: CLINIC | Age: 40
End: 2020-11-30

## 2020-11-30 NOTE — TELEPHONE ENCOUNTER
Calling in regards to pts. COVID-19 lab results. Requested a call back at [phone].  If results were received on [employer], pt. is advised to call employee screening line at [phone] for further instructions.

## 2020-12-08 ENCOUNTER — TELEMEDICINE (OUTPATIENT)
Dept: MEDICAL GROUP | Facility: PHYSICIAN GROUP | Age: 40
End: 2020-12-08
Payer: COMMERCIAL

## 2020-12-08 ENCOUNTER — HOSPITAL ENCOUNTER (OUTPATIENT)
Dept: LAB | Facility: MEDICAL CENTER | Age: 40
End: 2020-12-08
Attending: PHYSICIAN ASSISTANT
Payer: COMMERCIAL

## 2020-12-08 VITALS — WEIGHT: 195 LBS | RESPIRATION RATE: 12 BRPM | HEIGHT: 72 IN | BODY MASS INDEX: 26.41 KG/M2

## 2020-12-08 DIAGNOSIS — U07.1 COVID-19: ICD-10-CM

## 2020-12-08 PROCEDURE — U0003 INFECTIOUS AGENT DETECTION BY NUCLEIC ACID (DNA OR RNA); SEVERE ACUTE RESPIRATORY SYNDROME CORONAVIRUS 2 (SARS-COV-2) (CORONAVIRUS DISEASE [COVID-19]), AMPLIFIED PROBE TECHNIQUE, MAKING USE OF HIGH THROUGHPUT TECHNOLOGIES AS DESCRIBED BY CMS-2020-01-R: HCPCS

## 2020-12-08 PROCEDURE — 99213 OFFICE O/P EST LOW 20 MIN: CPT | Mod: CS,95,CR | Performed by: PHYSICIAN ASSISTANT

## 2020-12-08 PROCEDURE — C9803 HOPD COVID-19 SPEC COLLECT: HCPCS

## 2020-12-08 ASSESSMENT — FIBROSIS 4 INDEX: FIB4 SCORE: 0.36

## 2020-12-08 NOTE — PROGRESS NOTES
Virtual Visit: Established Patient   This visit was conducted via Zoom using secure and encrypted videoconferencing technology. The patient was in a private location in the state of [place].    The patient's identity was confirmed and verbal consent was obtained for this virtual visit.    Subjective:   CC:   Chief Complaint   Patient presents with   • Follow-Up     Covid-19       Caroline Grewal is a 40 y.o. female presenting for evaluation and management of:      COVID-19  COVID positive, 11/24/20- Started to have symptoms on the 24th. Wasn't feeling good, developed a fever. Over the last 2 weeks had body aches, cough. Still has a cough, no SOB. She is very fatigued. Today is 2 weeks. She was released to go back to work last Friday. Worked on Sunday. Hasn't had a fever in a few days.     Patient is supposed to get her kids back this week from their dad.  She does not want to expose them to Covid.  Because she is still symptomatic, I think it would be a good consideration to recheck her.  One of her children has asthma.       ROS   Denies any recent fevers or chills. No nausea or vomiting. No chest pains or shortness of breath.     Allergies   Allergen Reactions   • Dairy Food Allergy Rash     Pt states hands and legs       Current medicines (including changes today)  Current Outpatient Medications   Medication Sig Dispense Refill   • buPROPion (WELLBUTRIN XL) 300 MG XL tablet Take 1 Tab by mouth every day. 90 Tab 2   • naltrexone (DEPADE) 50 MG Tab Take 1 Tab by mouth every day for 90 days. 90 Tab 0   • sertraline (ZOLOFT) 100 MG Tab TAKE 2 TABS BY MOUTH EVERY DAY FOR 90 DAYS. 180 Tab 0   • albuterol 108 (90 Base) MCG/ACT Aero Soln inhalation aerosol INHALE 2 PUFFS BY MOUTH EVERY FOUR HOURS AS NEEDED FOR SHORTNESS OF BREATH. 1 Each 5   • JUNEL FE 1/20 1-20 MG-MCG per tablet TAKE 1 TABLET BY MOUTH DAILY 84 Tab 3     No current facility-administered medications for this visit.        Patient Active Problem List     "Diagnosis Date Noted   • COVID-19 12/08/2020   • Deviated septum 10/16/2020   • Bulimia nervosa, purging type 10/16/2020   • Cough 08/20/2020   • Moderate recurrent major depression (HCC) 06/30/2020   • Generalized anxiety disorder 06/30/2020   • Abnormal blood chemistry 06/04/2020   • Mixed hyperlipidemia 06/04/2020   • Anxiety 09/13/2019       Family History   Problem Relation Age of Onset   • Diabetes Mother    • Heart Disease Father         MI   • No Known Problems Sister        She  has a past medical history of Anxiety, Depression, and GERD (gastroesophageal reflux disease).  She  has a past surgical history that includes abdominal exploration.       Objective:   Resp 12   Ht 1.854 m (6' 1\")   Wt 88.5 kg (195 lb)   LMP 12/06/2020   Breastfeeding No   BMI 25.73 kg/m²     Physical Exam:  Constitutional: Alert, no distress, well-groomed.  Skin: No rashes in visible areas.  Eye: Round. Conjunctiva clear, lids normal. No icterus.   ENMT: Lips pink without lesions, good dentition, moist mucous membranes. Phonation normal.  Neck: No masses, no thyromegaly. Moves freely without pain.  Respiratory: Unlabored respiratory effort, no cough or audible wheeze  Psych: Alert and oriented x3, normal affect and mood.       Assessment and Plan:   The following treatment plan was discussed:     1. COVID-19  - COVID/SARS CoV-2 PCR; Future  Patient is 2 weeks status post positive Covid test today.  But she continues to be symptomatic.  Has had tactile fevers, and continues to have a cough.  No shortness of breath issues.  She is supposed to get her kids back from their dad this week.  She is concerned about exposing them to Covid, one of her children has asthma.  I think it is reasonable to recheck her Covid test at this time.  Will write an order for her.  Discussed with patient she should continue to rest, drink lots of fluids, take off work if she is not feeling well.        Follow-up: Return if symptoms worsen or fail to " improve.        The patient verbalized agreement and understanding of current plan. All questions and concerns were addressed at time of visit.    Please note that this dictation was created using voice recognition software. I have made every reasonable attempt to correct obvious errors, but I expect that there are errors of grammar and possibly content that I did not discover before finalizing the note.

## 2020-12-08 NOTE — ASSESSMENT & PLAN NOTE
COVID positive, 11/24/20- Started to have symptoms on the 24th. Wasn't feeling good, developed a fever. Over the last 2 weeks had body aches, cough. Still has a cough, no SOB. She is very fatigued. Today is 2 weeks. She was released to go back to work last Friday. Worked on Sunday. Hasn't had a fever in a few days.     Patient is supposed to get her kids back this week from their dad.  She does not want to expose them to Covid.  Because she is still symptomatic, I think it would be a good consideration to recheck her.  One of her children has asthma.

## 2020-12-16 DIAGNOSIS — Z23 NEED FOR VACCINATION: ICD-10-CM

## 2020-12-20 ENCOUNTER — APPOINTMENT (OUTPATIENT)
Dept: FAMILY PLANNING/WOMEN'S HEALTH CLINIC | Facility: IMMUNIZATION CENTER | Age: 40
End: 2020-12-20
Attending: FAMILY MEDICINE
Payer: COMMERCIAL

## 2020-12-20 ENCOUNTER — IMMUNIZATION (OUTPATIENT)
Dept: FAMILY PLANNING/WOMEN'S HEALTH CLINIC | Facility: IMMUNIZATION CENTER | Age: 40
End: 2020-12-20
Payer: COMMERCIAL

## 2020-12-20 DIAGNOSIS — Z23 ENCOUNTER FOR VACCINATION: Primary | ICD-10-CM

## 2020-12-20 DIAGNOSIS — Z23 NEED FOR VACCINATION: ICD-10-CM

## 2020-12-20 PROCEDURE — 0001A PFIZER SARS-COV-2 VACCINE: CPT

## 2020-12-20 PROCEDURE — 91300 PFIZER SARS-COV-2 VACCINE: CPT

## 2021-01-10 ENCOUNTER — IMMUNIZATION (OUTPATIENT)
Dept: FAMILY PLANNING/WOMEN'S HEALTH CLINIC | Facility: IMMUNIZATION CENTER | Age: 41
End: 2021-01-10
Attending: FAMILY MEDICINE
Payer: COMMERCIAL

## 2021-01-10 DIAGNOSIS — Z23 ENCOUNTER FOR VACCINATION: Primary | ICD-10-CM

## 2021-01-10 PROCEDURE — 91300 PFIZER SARS-COV-2 VACCINE: CPT

## 2021-01-10 PROCEDURE — 0002A PFIZER SARS-COV-2 VACCINE: CPT

## 2021-01-24 NOTE — PROGRESS NOTES
[facility] PS[facility]CH[facility] FOLLOW-UP NOTE    This provider informed the patient their medical records are totally confidential except for the use by other providers involved in their care, or if the patient signs a release, or to report instances of child or elder abuse, or if it is determined they are an immediate risk to harm themselves or others.  To avoid spread of covid-19 virus this appointment was conducted by telehealth.  The patient gave consent to have this follow up session by video telehealth.    Time at beginning of call:  10:00 AM    TOTAL FACE-TO-FACE TIME  30 minutes    CHIEF COMPLAINT       Having depression, generalized anxiety, panic attacks,  binging and purging.  HISTORY OF PRESENT ILLNESS       The patient is a  39yo W female and ED  who is followed by this provider for recurrent Major Depression and Generalized Anxiety Disorder with Panic Attacks.  She has had depressive episodes since she was 13yo which were exacerbated when her divorce was finalized in 2015 and she got shared custody of her 2 daughters.  Her depressive episodes have an atypical pattern of anhedonia, hypersomnia, variable appetite, and moderate psychomotor retardation.  She denies ever having an overt suicidal plan, intent, or attempt.  She is struggling emotionally and feels sad or angry and is very critical about herself.       She has a dysfunctional with her mother's family and only saw her father on weekends.  Her father didn't like fat people and the patient was chubby growing up.  Her father had a heart attack.       She has generalized anxiety disorder with panic attacks with restlessness, excessive worry and concern, irritability, muscle tension in her neck, shoulders, and upper back, and occasionally she gets so anxious that her mind goes blank.  She has panic attacks 3 to 4 times a month.       She is self-critical and wishes she could have more fun  "with her daughters.  She has better alleviation of depression and anxiety on sertraline 200mg po daily combined with bupropion XL 300mg po daily.  However, she has been struggling lately with some \"unhealthy behaviors\" (binging/purging, picking).  She will be started on a trial of naltrexone 50mg po daily to help curb these behaviors.  She has struggled with bulimic symptoms since she was 17yo.         Her  has been able to move to [place] and she sees her 18yo daughter more often.  She butts heads with her daughter because they are very much alike.  She is starting to exercise and it helps her manage to keep herself together.        She [facility] and still has some brain fog.but can get on her spin bike and does Kundalini yoga.  At times things move too fast in the ED.  PSYCHOSOCIAL CHANGES SINCE PREVIOUS CONTACT       Doing better in terms of bulimic symptoms and binging and purges are less frequent.  RESPONSE TO TREATMENT       Having alleviation of anxiety and panic attacks   PAST PSYCHIATRIC MEDICATIONS       Citalopram, bupropion XL, sertraline, naltrexone.  MEDICATION SIDE EFFECTS       None.    MEDICAL REVIEW OF SYSTEMS:   Constitutional negative   Eyes negative   Ears/Nose/Mouth/Throat negative   Cardiovascular negative   Respiratory positive - breast lump   Gastrointestinal positive - GERD   Genitourinary negative   Muscular negative   Integumentary positive - (R) shoulder pain   Neurological negative   Endocrine positive - hyperlipidemia   Hematologic/Lymphatic positive - varicose veins (R) lower extremity        MENTAL STATUS EVALUATION  There were no vitals taken for this visit.  Participation: Active verbal participation  Grooming:Neat  Orientation: Fully Oriented  Eye contact: Good  Behavior:Calm   Mood: Euthymic  Affect: Full range  Thought process: Logical  Thought content:  Within normal limits  Speech: Rate within normal limits and Volume within normal " limits  Perception:  Within normal limits  Memory:  No gross evidence of memory deficits  Insight: Good  Judgment: Good  Family/couple interaction observations:   Other:  Depression Screen (PHQ-2/PHQ-9) 8/5/2019 6/4/2020 6/30/2020   PHQ-2 Total Score 0 2 2   PHQ-9 Total Score - 11 7       Interpretation of PHQ-9 Total Score   Score Severity   1-4 No Depression   5-9 Mild Depression   10-14 Moderate Depression   15-19 Moderately Severe Depression   20-27 Severe Depression  Current risk:    Suicide: Low   Homicide: Not applicable   Self-harm: Low  Relapse: Moderate  Other:   Crisis Safety Plan reviewed?No  If evidence of imminent risk is present, intervention/plan:    Medical Records/Labs/Diagnostic Tests Reviewed: yes    Medical Records/Labs/Diagnostic Tests Ordered: no    DIAGNOSTIC IMPRESSIONS       (1) Major Depression, Recurrent, Moderate (F33.1)       (2) Generalized Anxiety Disorder with Panic Attacks (F41.1)       (3) Bulimia Nervosa (F50.2)    ASSESSMENT AND PLAN       Her binging and purging are compulsive behaviors under increased stress and low self esteem.  Her sertraline is 200mg po daily which should act against compulsive behaviors and she was started on naltrexone 50mg po daily to reduce craving and picking behavior.       Continue naltrexone 50mg po daily.       Continue sertraline 200mg po daily.       Continue bupropion XL 300mg po daily.       RTC to  Clinic in 2 months for 30 min follow up with this provider.    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  Her negative self-image is helped by her exercising.

## 2021-01-25 ENCOUNTER — TELEMEDICINE (OUTPATIENT)
Dept: BEHAVIORAL HEALTH | Facility: CLINIC | Age: 41
End: 2021-01-25
Payer: COMMERCIAL

## 2021-01-25 VITALS — WEIGHT: 195 LBS | HEIGHT: 72 IN | BODY MASS INDEX: 26.41 KG/M2

## 2021-01-25 DIAGNOSIS — F41.1 GENERALIZED ANXIETY DISORDER: ICD-10-CM

## 2021-01-25 DIAGNOSIS — F33.1 MODERATE RECURRENT MAJOR DEPRESSION (HCC): ICD-10-CM

## 2021-01-25 DIAGNOSIS — F50.2 BULIMIA NERVOSA, PURGING TYPE: ICD-10-CM

## 2021-01-25 PROCEDURE — 90833 PSYTX W PT W E/M 30 MIN: CPT | Mod: 95,CR | Performed by: PSYCHIATRY & NEUROLOGY

## 2021-01-25 PROCEDURE — 99213 OFFICE O/P EST LOW 20 MIN: CPT | Mod: 95,CR | Performed by: PSYCHIATRY & NEUROLOGY

## 2021-01-25 RX ORDER — BUPROPION HYDROCHLORIDE 300 MG/1
300 TABLET ORAL
Qty: 90 TAB | Refills: 2 | Status: SHIPPED | OUTPATIENT
Start: 2021-01-25 | End: 2021-03-25 | Stop reason: SDUPTHER

## 2021-01-25 RX ORDER — SERTRALINE HYDROCHLORIDE 100 MG/1
200 TABLET, FILM COATED ORAL DAILY
Qty: 180 TAB | Refills: 0 | Status: SHIPPED | OUTPATIENT
Start: 2021-01-25 | End: 2021-03-25 | Stop reason: SDUPTHER

## 2021-01-25 RX ORDER — NALTREXONE HYDROCHLORIDE 50 MG/1
50 TABLET, FILM COATED ORAL DAILY
Qty: 90 TAB | Refills: 0 | Status: SHIPPED | OUTPATIENT
Start: 2021-01-25 | End: 2021-03-25 | Stop reason: SDUPTHER

## 2021-01-25 ASSESSMENT — FIBROSIS 4 INDEX: FIB4 SCORE: 0.36

## 2021-02-22 DIAGNOSIS — Z76.0 MEDICATION REFILL: ICD-10-CM

## 2021-02-22 DIAGNOSIS — Z30.41 ORAL CONTRACEPTIVE USE: ICD-10-CM

## 2021-02-23 RX ORDER — NORETHINDRONE ACETATE AND ETHINYL ESTRADIOL 1MG-20(21)
KIT ORAL
Qty: 84 TABLET | Refills: 3 | Status: SHIPPED | OUTPATIENT
Start: 2021-02-23 | End: 2021-10-12

## 2021-02-23 NOTE — TELEPHONE ENCOUNTER
Received request via: Pharmacy    Was the patient seen in the last year in this department? Yes  12/8/20  Does the patient have an active prescription (recently filled or refills available) for medication(s) requested? No

## 2021-02-24 ENCOUNTER — PATIENT MESSAGE (OUTPATIENT)
Dept: MEDICAL GROUP | Facility: PHYSICIAN GROUP | Age: 41
End: 2021-02-24

## 2021-02-24 DIAGNOSIS — Z30.09 BIRTH CONTROL COUNSELING: ICD-10-CM

## 2021-03-01 ENCOUNTER — APPOINTMENT (OUTPATIENT)
Dept: LAB | Facility: MEDICAL CENTER | Age: 41
End: 2021-03-01
Attending: PHYSICIAN ASSISTANT
Payer: COMMERCIAL

## 2021-03-03 ENCOUNTER — HOSPITAL ENCOUNTER (OUTPATIENT)
Dept: LAB | Facility: MEDICAL CENTER | Age: 41
End: 2021-03-03
Attending: PHYSICIAN ASSISTANT
Payer: COMMERCIAL

## 2021-03-03 DIAGNOSIS — E87.1 HYPONATREMIA: ICD-10-CM

## 2021-03-03 DIAGNOSIS — E78.2 MIXED HYPERLIPIDEMIA: ICD-10-CM

## 2021-03-03 LAB
ANION GAP SERPL CALC-SCNC: 8 MMOL/L (ref 7–16)
BUN SERPL-MCNC: 15 MG/DL (ref 8–22)
CALCIUM SERPL-MCNC: 9.5 MG/DL (ref 8.5–10.5)
CHLORIDE SERPL-SCNC: 102 MMOL/L (ref 96–112)
CHOLEST SERPL-MCNC: 255 MG/DL (ref 100–199)
CO2 SERPL-SCNC: 23 MMOL/L (ref 20–33)
CREAT SERPL-MCNC: 0.95 MG/DL (ref 0.5–1.4)
FASTING STATUS PATIENT QL REPORTED: NORMAL
GLUCOSE SERPL-MCNC: 84 MG/DL (ref 65–99)
HDLC SERPL-MCNC: 66 MG/DL
LDLC SERPL CALC-MCNC: 170 MG/DL
POTASSIUM SERPL-SCNC: 4 MMOL/L (ref 3.6–5.5)
SODIUM SERPL-SCNC: 133 MMOL/L (ref 135–145)
TRIGL SERPL-MCNC: 93 MG/DL (ref 0–149)

## 2021-03-03 PROCEDURE — 80048 BASIC METABOLIC PNL TOTAL CA: CPT

## 2021-03-03 PROCEDURE — 80061 LIPID PANEL: CPT

## 2021-03-03 PROCEDURE — 36415 COLL VENOUS BLD VENIPUNCTURE: CPT

## 2021-03-04 ENCOUNTER — TELEPHONE (OUTPATIENT)
Dept: MEDICAL GROUP | Facility: PHYSICIAN GROUP | Age: 41
End: 2021-03-04

## 2021-03-04 ENCOUNTER — PATIENT MESSAGE (OUTPATIENT)
Dept: BEHAVIORAL HEALTH | Facility: CLINIC | Age: 41
End: 2021-03-04

## 2021-03-04 NOTE — TELEPHONE ENCOUNTER
----- Message from Catarina Gregorio P.A.-C. sent at 3/4/2021  9:31 AM PST -----  Please call patient about their labs.     There are a few things on their labs I'd like to talk about. Please schedule a follow up.     Thank you,    Catarina Gregorio P.A.-C.

## 2021-03-09 PROBLEM — E87.1 LOW SODIUM LEVELS: Status: ACTIVE | Noted: 2021-03-09

## 2021-03-09 PROBLEM — E78.5 DYSLIPIDEMIA: Status: ACTIVE | Noted: 2020-06-04

## 2021-03-09 NOTE — ASSESSMENT & PLAN NOTE
Sodium slightly low on her labs from March 3, 2021 at 133.  Previous labs showed sodium at 131.  Recommend she increase her salt intake in her diet just slightly.  Repeat labs in 2 weeks to ensure resolution.    She does drink a lot of water.

## 2021-03-09 NOTE — ASSESSMENT & PLAN NOTE
Followed by behavioral health, patient currently on Wellbutrin 300 mg daily and sertraline 200 mg daily.  Also on naltrexone 50 mg daily.

## 2021-03-09 NOTE — ASSESSMENT & PLAN NOTE
This is a chronic condition.   Latest Labs:   Lab Results   Component Value Date/Time    CHOLSTRLTOT 255 (H) 03/03/2021 08:41 AM     (H) 03/03/2021 08:41 AM    HDL 66 03/03/2021 08:41 AM    TRIGLYCERIDE 93 03/03/2021 08:41 AM      Medications: none-Discussed lifestyle modifications today.         Patient has been tracking on an sue what she eats. She has been staying within calories, making sure she is getting enough proteins, but wasn't paying attention to the fat necessarily. Since her labs she modified her fat content.     She is taking fish oil as well.

## 2021-03-10 ENCOUNTER — TELEMEDICINE (OUTPATIENT)
Dept: MEDICAL GROUP | Facility: PHYSICIAN GROUP | Age: 41
End: 2021-03-10
Payer: COMMERCIAL

## 2021-03-10 VITALS — BODY MASS INDEX: 26.41 KG/M2 | RESPIRATION RATE: 12 BRPM | WEIGHT: 195 LBS | HEIGHT: 72 IN

## 2021-03-10 DIAGNOSIS — E78.5 DYSLIPIDEMIA: ICD-10-CM

## 2021-03-10 DIAGNOSIS — E87.1 LOW SODIUM LEVELS: ICD-10-CM

## 2021-03-10 DIAGNOSIS — F50.2 BULIMIA NERVOSA, PURGING TYPE: ICD-10-CM

## 2021-03-10 PROCEDURE — 99213 OFFICE O/P EST LOW 20 MIN: CPT | Mod: 95,CR | Performed by: PHYSICIAN ASSISTANT

## 2021-03-10 ASSESSMENT — FIBROSIS 4 INDEX: FIB4 SCORE: 0.36

## 2021-03-10 NOTE — PROGRESS NOTES
Virtual Visit: Established Patient   This visit was conducted via Zoom using secure and encrypted videoconferencing technology. The patient was in a private location in the state of [place].    The patient's identity was confirmed and verbal consent was obtained for this virtual visit.    Subjective:   CC:   Chief Complaint   Patient presents with   • Lab Results   • Dyslipidemia       Caroline Grewal is a 40 y.o. female presenting for evaluation and management of:    Health Maintenance: UTD    Dyslipidemia  This is a chronic condition.   Latest Labs:   Lab Results   Component Value Date/Time    CHOLSTRLTOT 255 (H) 03/03/2021 08:41 AM     (H) 03/03/2021 08:41 AM    HDL 66 03/03/2021 08:41 AM    TRIGLYCERIDE 93 03/03/2021 08:41 AM      Medications: none-Discussed lifestyle modifications today.         Patient has been tracking on an sue what she eats. She has been staying within calories, making sure she is getting enough proteins, but wasn't paying attention to the fat necessarily. Since her labs she modified her fat content.     She is taking fish oil as well.       Bulimia nervosa, purging type  Followed by behavioral health, patient currently on Wellbutrin 300 mg daily and sertraline 200 mg daily.  Also on naltrexone 50 mg daily.    Low sodium levels  Sodium slightly low on her labs from March 3, 2021 at 133.  Previous labs showed sodium at 131.  Recommend she increase her salt intake in her diet just slightly.  Repeat labs in 2 weeks to ensure resolution.    She does drink a lot of water.       ROS   Denies any recent fevers or chills. No nausea or vomiting. No chest pains or shortness of breath.     Allergies   Allergen Reactions   • Dairy Food Allergy Rash     Pt states hands and legs       Current medicines (including changes today)  Current Outpatient Medications   Medication Sig Dispense Refill   • buPROPion (WELLBUTRIN XL) 300 MG XL tablet Take 1 Tab by mouth every day. 90 Tab 2   • sertraline  "(ZOLOFT) 100 MG Tab Take 2 Tabs by mouth every day for 90 days. 180 Tab 0   • naltrexone (DEPADE) 50 MG Tab Take 1 Tab by mouth every day for 90 days. 90 Tab 0   • albuterol 108 (90 Base) MCG/ACT Aero Soln inhalation aerosol INHALE 2 PUFFS BY MOUTH EVERY FOUR HOURS AS NEEDED FOR SHORTNESS OF BREATH. 1 Each 5   • norethindrone-ethinyl estradiol (JUNEL FE 1/20) 1-20 MG-MCG per tablet TAKE 1 TABLET BY MOUTH DAILY (Patient not taking: Reported on 3/10/2021) 84 tablet 3     No current facility-administered medications for this visit.       Patient Active Problem List    Diagnosis Date Noted   • Low sodium levels 03/09/2021   • COVID-19 12/08/2020   • Deviated septum 10/16/2020   • Bulimia nervosa, purging type 10/16/2020   • Cough 08/20/2020   • Moderate recurrent major depression (HCC) 06/30/2020   • Generalized anxiety disorder 06/30/2020   • Abnormal blood chemistry 06/04/2020   • Dyslipidemia 06/04/2020   • Anxiety 09/13/2019       Family History   Problem Relation Age of Onset   • Diabetes Mother    • Heart Disease Father         MI   • No Known Problems Sister        She  has a past medical history of Anxiety, Depression, and GERD (gastroesophageal reflux disease).  She  has a past surgical history that includes abdominal exploration.       Objective:   Resp 12   Ht 1.854 m (6' 1\")   Wt 88.5 kg (195 lb)   LMP 02/27/2021   BMI 25.73 kg/m²     Physical Exam:  Constitutional: Alert, no distress, well-groomed.  Skin: No rashes in visible areas.  Eye: Round. Conjunctiva clear, lids normal. No icterus.   ENMT: Lips pink without lesions, good dentition, moist mucous membranes. Phonation normal.  Neck: No masses, no thyromegaly. Moves freely without pain.  Respiratory: Unlabored respiratory effort, no cough or audible wheeze  Psych: Alert and oriented x3, normal affect and mood.       Assessment and Plan:   The following treatment plan was discussed:     1. Dyslipidemia  - Lipid Profile; Future  TG improved, LDL " elevated. Continue fish oil for TG's. Patient tracked her meals and noticed she had been eating some foods high in fat. She has made adjustments and will continue to track her macros. Repeat labs in 90 days. If still elevated discuss medication.   2. Bulimia nervosa, purging type  Followed by . Continues wellbutrin 300 mg daily, sertraline 100 mg dialy, they added naltrexone 50 mg daily. Doing very well with this.   3. Low sodium levels  - Basic Metabolic Panel; Future  Suspect overhydration, she does drink quite a bit of water. Improved from previous labs. Will repeat with next labs.       Follow-up: Return in about 3 months (around 6/10/2021) for Follow up on labs.        The patient verbalized agreement and understanding of current plan. All questions and concerns were addressed at time of visit.    Please note that this dictation was created using voice recognition software. I have made every reasonable attempt to correct obvious errors, but I expect that there are errors of grammar and possibly content that I did not discover before finalizing the note.

## 2021-03-25 ENCOUNTER — TELEMEDICINE (OUTPATIENT)
Dept: BEHAVIORAL HEALTH | Facility: CLINIC | Age: 41
End: 2021-03-25
Payer: COMMERCIAL

## 2021-03-25 VITALS — HEIGHT: 72 IN | WEIGHT: 190 LBS | BODY MASS INDEX: 25.73 KG/M2

## 2021-03-25 DIAGNOSIS — F50.2 BULIMIA NERVOSA, PURGING TYPE: ICD-10-CM

## 2021-03-25 DIAGNOSIS — F33.1 MODERATE RECURRENT MAJOR DEPRESSION (HCC): ICD-10-CM

## 2021-03-25 DIAGNOSIS — F41.1 GENERALIZED ANXIETY DISORDER: ICD-10-CM

## 2021-03-25 PROCEDURE — 90833 PSYTX W PT W E/M 30 MIN: CPT | Mod: 95,CR | Performed by: PSYCHIATRY & NEUROLOGY

## 2021-03-25 PROCEDURE — 99213 OFFICE O/P EST LOW 20 MIN: CPT | Mod: 95,CR | Performed by: PSYCHIATRY & NEUROLOGY

## 2021-03-25 RX ORDER — NALTREXONE HYDROCHLORIDE 50 MG/1
50 TABLET, FILM COATED ORAL DAILY
Qty: 90 TABLET | Refills: 0 | Status: SHIPPED | OUTPATIENT
Start: 2021-03-25 | End: 2021-06-09 | Stop reason: SDUPTHER

## 2021-03-25 RX ORDER — SERTRALINE HYDROCHLORIDE 100 MG/1
200 TABLET, FILM COATED ORAL DAILY
Qty: 180 TABLET | Refills: 0 | Status: SHIPPED | OUTPATIENT
Start: 2021-03-25 | End: 2021-06-09 | Stop reason: SDUPTHER

## 2021-03-25 RX ORDER — BUPROPION HYDROCHLORIDE 300 MG/1
300 TABLET ORAL
Qty: 90 TABLET | Refills: 2 | Status: SHIPPED | OUTPATIENT
Start: 2021-03-25 | End: 2021-06-09 | Stop reason: SDUPTHER

## 2021-03-25 ASSESSMENT — FIBROSIS 4 INDEX: FIB4 SCORE: 0.36

## 2021-03-25 NOTE — PROGRESS NOTES
[facility] FOLLOW-UP NOTE    This provider informed the patient their medical records are totally confidential except for the use by other providers involved in their care, or if the patient signs a release, or to report instances of child or elder abuse, or if it is determined they are an immediate risk to harm themselves or others.  To avoid spread of covid-19 virus this appointment was conducted by telehealth.  The patient gave consent to have this follow up session by video telehealth.    Time at beginning of call:  10:00 AM    TOTAL FACE-TO-FACE TIME  30 minutes    CHIEF COMPLAINT       Having depression, generalized anxiety, panic attacks,  binging and purging.  HISTORY OF PRESENT ILLNESS       The patient is a  41yo W female and ED  who is followed by this provider for recurrent Major Depression and Generalized Anxiety Disorder with Panic Attacks.  She has had depressive episodes since she was 13yo which were exacerbated when her divorce was finalized in 2015 and she got shared custody of her 2 daughters.  Her depressive episodes have an atypical pattern of anhedonia, hypersomnia, variable appetite, and moderate psychomotor retardation.  She denies ever having an overt suicidal plan, intent, or attempt.  She is struggling emotionally and feels sad or angry and is very critical about herself.       She has a dysfunctional relationship with her mother's family and only saw her father on weekends.  Her father didn't like fat people and the patient was chubby growing up.  Her father had a heart attack.       She has generalized anxiety disorder with panic attacks with restlessness, excessive worry and concern, irritability, muscle tension in her neck, shoulders, and upper back, and occasionally she gets so anxious that her mind goes blank.  She has panic attacks 3 to 4 times a month.       She is self-critical and wishes she could  "have more fun with her daughters.  She has better alleviation of depression and anxiety on sertraline 200mg po daily combined with bupropion XL 300mg po daily.  However, she has been struggling lately with some \"unhealthy behaviors\" (binging/purging, picking).  She will be started on a trial of naltrexone 50mg po daily to help curb these behaviors.  She has struggled with bulimic symptoms since she was 17yo.  The naltrexone has decreased her unwanted behaviors.       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       Her  has been able to move to [place] and she sees her 18yo daughter more often.  She butts heads with her daughter because they are very much alike.  She is starting to exercise and it helps her manage to keep herself together.        She had corona virus infection around [facility] and still has some brain fog but can get on her spin bike and does Kundalini yoga.  At times things move too fast in the ED.  PSYCHOSOCIAL CHANGES SINCE PREVIOUS CONTACT       The patient got in to nursing school and her  works for "[employer]  RESPONSE TO TREATMENT       Having alleviation of anxiety and panic attacks by sertraline 200mg po daily.  PAST PSYCHIATRIC MEDICATIONS       Citalopram, bupropion XL, sertraline, naltrexone.  MEDICATION SIDE EFFECTS       None    MEDICAL REVIEW OF SYSTEMS:   Constitutional negative   Eyes negative   Ears/Nose/Mouth/Throat negative   Cardiovascular negative   Respiratory positive - breast lump   Gastrointestinal positive - GERD   Genitourinary negative   Muscular negative   Integumentary positive - (R) shoulder pain   Neurological negative   Endocrine positive - hyperlipidemia   Hematologic/Lymphatic positive - varicose veins (R) lower extremity        MENTAL STATUS EVALUATION  [facility] 02/27/2021   Participation: Active verbal participation  Grooming:Neat  Orientation: Fully Oriented  Eye contact: Good  Behavior:Calm "   Mood: Euthymic  Affect: Full range  Thought process: Logical  Thought content:  Within normal limits  Speech: Rate within normal limits and Volume within normal limits  Perception:  Within normal limits  Memory:  No gross evidence of memory deficits  Insight: Good  Judgment: Good  Family/couple interaction observations:   Other:  Depression Screen (PHQ-2/PHQ-9) 8/5/2019 6/4/2020 6/30/2020   PHQ-2 Total Score 0 2 2   PHQ-9 Total Score - 11 7       Interpretation of PHQ-9 Total Score   Score Severity   1-4 No Depression   5-9 Mild Depression   10-14 Moderate Depression   15-19 Moderately Severe Depression   20-27 Severe Depression  Current risk:    Suicide: Low   Homicide: Not applicable   Self-harm: Low  Relapse: Moderate  Other:   Crisis Safety Plan reviewed?No  If evidence of imminent risk is present, intervention/plan:    Medical Records/Labs/Diagnostic Tests Reviewed: yes    Medical Records/Labs/Diagnostic Tests Ordered: no    DIAGNOSTIC IMPRESSIONS       (1) Major Depression, Recurrent, Moderate (F33.1)       (2) Generalized Anxiety Disorder with Panic Attacks (F41.1)       (3) Bulimia Nervosa (F50.2)    ASSESSMENT AND PLAN       Her binging and purging are compulsive behaviors under increased stress and low self esteem.  Her sertraline is 200mg po daily which should act against compulsive behaviors and she was started on naltrexone 50mg po daily to reduce craving and picking behavior.       Continue naltrexone 50mg po daily.       Continue sertraline 200mg po daily.       Continue bupropion XL 300mg po daily.       RTC to  Clinic in 2 months for 30 min follow up with this provider.    Time at end of call:  10:30 AM    30 minutes spent in psychotherapy  Topics addressed in psychotherapy include:  Discussed trying to validate her daughters and having a constructive dialogue.  Her daughters are a potential source of support and eventual friendship.  She is very sensitive about her body image but maintains her  weight through purging.

## 2021-04-22 ENCOUNTER — GYNECOLOGY VISIT (OUTPATIENT)
Dept: OBGYN | Facility: CLINIC | Age: 41
End: 2021-04-22
Payer: COMMERCIAL

## 2021-04-22 VITALS — DIASTOLIC BLOOD PRESSURE: 63 MMHG | SYSTOLIC BLOOD PRESSURE: 113 MMHG | BODY MASS INDEX: 26.12 KG/M2 | WEIGHT: 198 LBS

## 2021-04-22 DIAGNOSIS — Z30.09 CONSULTATION FOR STERILIZATION: ICD-10-CM

## 2021-04-22 PROCEDURE — 99203 OFFICE O/P NEW LOW 30 MIN: CPT | Performed by: OBSTETRICS & GYNECOLOGY

## 2021-04-22 ASSESSMENT — FIBROSIS 4 INDEX: FIB4 SCORE: 0.36

## 2021-04-22 NOTE — PROGRESS NOTES
Subjective:      Caroline Grewal is a 40 y.o. female who presents with New Patient (birth control)        CC: Consult regarding permanent sterilization    HPI: 40-year-old  4 para 2-0-2-2, last menstrual period 2021, not currently using contraception, presents for consultation regarding permanent sterilization.  Patient states her menses are every 20 days, lasting 4 to 5 days.  She denies intermenstrual bleeding.  She has used combination OCPs, Ortho Evra, and ParaGard IUD in the past.  She recently stopped OCPs, and states she feels much better off of them.  She has a history of bulimia.  She also has a history of a abdominal plasty.    Past Medical History:   Diagnosis Date   • Anxiety    • Depression    • GERD (gastroesophageal reflux disease)        Past Surgical History:   Procedure Laterality Date   • ABDOMINAL EXPLORATION      Abdominoplasty         Current Outpatient Medications:   •  sertraline (ZOLOFT) 100 MG Tab, Take 2 Tablets by mouth every day for 90 days., Disp: 180 tablet, Rfl: 0  •  buPROPion (WELLBUTRIN XL) 300 MG XL tablet, Take 1 tablet by mouth every day., Disp: 90 tablet, Rfl: 2  •  naltrexone (DEPADE) 50 MG Tab, Take 1 tablet by mouth every day for 90 days., Disp: 90 tablet, Rfl: 0  •  albuterol 108 (90 Base) MCG/ACT Aero Soln inhalation aerosol, INHALE 2 PUFFS BY MOUTH EVERY FOUR HOURS AS NEEDED FOR SHORTNESS OF BREATH., Disp: 1 Each, Rfl: 5  •  norethindrone-ethinyl estradiol (JUNEL FE 1/20) 1-20 MG-MCG per tablet, TAKE 1 TABLET BY MOUTH DAILY (Patient not taking: Reported on 3/10/2021), Disp: 84 tablet, Rfl: 3    Dairy food allergy    Family History   Problem Relation Age of Onset   • Diabetes Mother    • Heart Disease Father         MI   • No Known Problems Sister        Social History     Socioeconomic History   • Marital status:      Spouse name: Not on file   • Number of children: Not on file   • Years of education: Not on file   • Highest education level: Not on  file   Occupational History   • Not on file   Tobacco Use   • Smoking status: Former Smoker     Types: Cigarettes     Quit date: 2018     Years since quitting: 3.3   • Smokeless tobacco: Never Used   Substance and Sexual Activity   • Alcohol use: Yes   • Drug use: No   • Sexual activity: Yes     Partners: Male     Birth control/protection: Pill   Other Topics Concern   • Not on file   Social History Narrative   • Not on file     Social Determinants of Health     Financial Resource Strain:    • Difficulty of Paying Living Expenses:    Food Insecurity:    • Worried About Running Out of Food in the Last Year:    • Ran Out of Food in the Last Year:    Transportation Needs:    • Lack of Transportation (Medical):    • Lack of Transportation (Non-Medical):    Physical Activity:    • Days of Exercise per Week:    • Minutes of Exercise per Session:    Stress:    • Feeling of Stress :    Social Connections:    • Frequency of Communication with Friends and Family:    • Frequency of Social Gatherings with Friends and Family:    • Attends Oriental orthodox Services:    • Active Member of Clubs or Organizations:    • Attends Club or Organization Meetings:    • Marital Status:    Intimate Partner Violence:    • Fear of Current or Ex-Partner:    • Emotionally Abused:    • Physically Abused:    • Sexually Abused:        OB History    Para Term  AB Living   4 2 2 0 2 2   SAB TAB Ectopic Molar Multiple Live Births   1 1 0 0 0 2       ROS REVIEW OF SYSTEMS:    Pertinent positives and negatives mentioned in HPI.    All other systems reviewed and are negative or noncontributory.       Objective:     /63   Wt 89.8 kg (198 lb)   LMP 2021   BMI 26.12 kg/m²      Physical Exam      GENERAL: Alert, in no apparent distress  PSYCHIATRIC: Appropriate affect, intact insight and judgement.  NECK:  Nontender, no masses.  No Thyromegaly or nodules. No lymphadenopathy.  RESPIRATORY: Normal respiratory effort.  Lungs clear to  auscultation.   CARDIOVASCULAR: RRR, no murmur, gallop, or rub.  ABDOMEN: Soft, nontender, nondistended.  No palpable masses.  No rebound or guarding.  No inguinal lymphadenopathy.  No hepatosplenomegaly.  No hernias.  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  EXTREMITIES: No edema  SKIN: No rash         Assessment/Plan:        1. Consultation for sterilization  The risks, benefits, alternatives, and indications of permanent sterilization (Bilateral Tubal Ligation or Bilateral Salpingectomies) were discussed with the patient today. Specifically,  I discussed that bilateral tubal ligation or bilateral salpingectomies are considered a permanent procedure and the patient will not be able to get pregnant after the tubal ligation is performed. I  also discussed the risk of sterilization failure, which is one in 200 surgeries, if fistula formation. The patient is informed that if tubal ligation fails, she has a risk of intrauterine pregnancy or increased risk of ectopic pregnancy due to tubal ligation. Alternative methods for birth control were discussed including abstinence, barrier methods condoms and diaphragm, and hormonal contraception including birth control pills, Depo-Provera, Nexplanon, intrauterine device and male sterilization. We also discussed the risk of tubal regret syndrome which is more common in those of young age and low parity. The patient voiced understanding of all these risks.  We also discussed the potential benefits of removing both tubes with possible reduction in future risk of ovarian cancer, but it may increase the complexity of the procedure and the length of the procedure.    The patient has a history of a prior abdominoplasty.  We discussed that this will make laparoscopic entry more difficult and with increased risks of damage to surrounding organs.  The patient also states her  is unsure if he wants more children.  Discussed that vasectomy can be done in the  office, and would be much less risky than laparoscopic bilateral salpingectomy in her case.    Patient will discuss with her , and get back to me if she desires laparoscopic bilateral salpingectomy.  She declines other options of birth control at this time.    Follow-up as needed.

## 2021-04-26 ENCOUNTER — TELEMEDICINE (OUTPATIENT)
Dept: MEDICAL GROUP | Facility: PHYSICIAN GROUP | Age: 41
End: 2021-04-26
Payer: COMMERCIAL

## 2021-04-26 VITALS — WEIGHT: 195 LBS | BODY MASS INDEX: 26.41 KG/M2 | HEIGHT: 72 IN | RESPIRATION RATE: 12 BRPM

## 2021-04-26 DIAGNOSIS — R06.02 SOB (SHORTNESS OF BREATH): ICD-10-CM

## 2021-04-26 PROCEDURE — 99213 OFFICE O/P EST LOW 20 MIN: CPT | Mod: 95,CR | Performed by: PHYSICIAN ASSISTANT

## 2021-04-26 ASSESSMENT — FIBROSIS 4 INDEX: FIB4 SCORE: 0.36

## 2021-04-26 NOTE — PROGRESS NOTES
Virtual Visit: Established Patient   This visit was conducted via Zoom using secure and encrypted videoconferencing technology. The patient was in a private location in the state of [place].    The patient's identity was confirmed and verbal consent was obtained for this virtual visit.    Subjective:   CC:   Chief Complaint   Patient presents with   • Shortness of Breath     x 2 weeks       Caroline Grewal is a 40 y.o. female presenting for evaluation and management of:    Health Maintenance: UTD    SOB (shortness of breath)  SOB x 2 weeks. Last Monday she was having significant SOB, she felt that she needed to take a big breath. She started to have chest pressure on last Monday, was at work at [employer], had EKG that was normal per patient. She started using her rescue inhaler and this did help. She occasionally had productive cough. No fever or chills. No increased anxiety the last couple weeks, and again albuterol did help improve it. No allergies right now. Denies any wheezing. History of COVID in 11/2020.  Patient is not having any leg swelling or palpitations.  No aggravating factors.      ROS   Denies any recent fevers or chills. No nausea or vomiting. SOB see HPI    Allergies   Allergen Reactions   • Dairy Food Allergy Rash     Pt states hands and legs       Current medicines (including changes today)  Current Outpatient Medications   Medication Sig Dispense Refill   • sertraline (ZOLOFT) 100 MG Tab Take 2 Tablets by mouth every day for 90 days. 180 tablet 0   • buPROPion (WELLBUTRIN XL) 300 MG XL tablet Take 1 tablet by mouth every day. 90 tablet 2   • naltrexone (DEPADE) 50 MG Tab Take 1 tablet by mouth every day for 90 days. 90 tablet 0   • albuterol 108 (90 Base) MCG/ACT Aero Soln inhalation aerosol INHALE 2 PUFFS BY MOUTH EVERY FOUR HOURS AS NEEDED FOR SHORTNESS OF BREATH. 1 Each 5   • norethindrone-ethinyl estradiol (JUNEL FE 1/20) 1-20 MG-MCG per tablet TAKE 1 TABLET BY MOUTH DAILY (Patient not  "taking: Reported on 3/10/2021) 84 tablet 3     No current facility-administered medications for this visit.       Patient Active Problem List    Diagnosis Date Noted   • SOB (shortness of breath) 04/26/2021   • Low sodium levels 03/09/2021   • COVID-19 12/08/2020   • Deviated septum 10/16/2020   • Bulimia nervosa, purging type 10/16/2020   • Cough 08/20/2020   • Moderate recurrent major depression (HCC) 06/30/2020   • Generalized anxiety disorder 06/30/2020   • Abnormal blood chemistry 06/04/2020   • Dyslipidemia 06/04/2020   • Anxiety 09/13/2019       Family History   Problem Relation Age of Onset   • Diabetes Mother    • Heart Disease Father         MI   • No Known Problems Sister        She  has a past medical history of Anxiety, Depression, and GERD (gastroesophageal reflux disease).  She  has a past surgical history that includes abdominal exploration.       Objective:   Resp 12   Ht 1.854 m (6' 1\")   Wt 88.5 kg (195 lb)   LMP 04/16/2021   BMI 25.73 kg/m²     Physical Exam:  Constitutional: Alert, no distress, well-groomed.  Skin: No rashes in visible areas.  Eye: Round. Conjunctiva clear, lids normal. No icterus.   ENMT: Lips pink without lesions, good dentition, moist mucous membranes. Phonation normal.  Neck: No masses, no thyromegaly. Moves freely without pain.  Respiratory: Unlabored respiratory effort, no cough or audible wheeze  Psych: Alert and oriented x3, normal affect and mood.       Assessment and Plan:   The following treatment plan was discussed:     1. SOB (shortness of breath)  - DX-CHEST-2 VIEWS; Future  - CBC WITH DIFFERENTIAL; Future  - Comp Metabolic Panel; Future  Differential diagnosis would include reactive airway disease, and infectious process either bacterial versus viral, atypical anxiety, or anemia.  Patient did have Covid 19 back in November 2020.  Denies any palpitations, chest pressure last Monday this is since resolved.  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  This is not documented in the computer.  Patient states that albuterol inhaler does help resolve her shortness of breath.  I would like to proceed with a CBC, CMP and chest x-ray for the patient.  She does have a mild productive cough as well.  But no fever or chills.  Hold on antibiotics until further work-up.  It would be ideal to have the patient come into the clinic for a formal physical exam to be able to listen to her heart and lungs.  Discussed this with patient.  If her symptoms worsen I would recommend she seek medical care immediately including urgent care or ED.  Follow-up: Return in about 2 weeks (around 5/10/2021) for Follow up on labs and imaging.        The patient verbalized agreement and understanding of current plan. All questions and concerns were addressed at time of visit.    Please note that this dictation was created using voice recognition software. I have made every reasonable attempt to correct obvious errors, but I expect that there are errors of grammar and possibly content that I did not discover before finalizing the note.

## 2021-04-26 NOTE — ASSESSMENT & PLAN NOTE
SOB x 2 weeks. Last Monday she was having significant SOB, she felt that she needed to take a big breath. She started to have chest pressure on last Monday, was at work at [employer], had EKG that was normal per patient. She started using her rescue inhaler and this did help. She occasionally had productive cough. No fever or chills. No increased anxiety the last couple weeks, and again albuterol did help improve it. No allergies right now. Denies any wheezing. History of COVID in 11/2020.  Patient is not having any leg swelling or palpitations.  No aggravating factors.

## 2021-05-05 ENCOUNTER — TELEPHONE (OUTPATIENT)
Dept: MEDICAL GROUP | Facility: PHYSICIAN GROUP | Age: 41
End: 2021-05-05

## 2021-05-05 ENCOUNTER — HOSPITAL ENCOUNTER (OUTPATIENT)
Dept: RADIOLOGY | Facility: MEDICAL CENTER | Age: 41
End: 2021-05-05
Attending: PHYSICIAN ASSISTANT
Payer: COMMERCIAL

## 2021-05-05 ENCOUNTER — HOSPITAL ENCOUNTER (OUTPATIENT)
Dept: LAB | Facility: MEDICAL CENTER | Age: 41
End: 2021-05-05
Attending: PHYSICIAN ASSISTANT
Payer: COMMERCIAL

## 2021-05-05 DIAGNOSIS — R06.02 SOB (SHORTNESS OF BREATH): ICD-10-CM

## 2021-05-05 LAB
ALBUMIN SERPL BCP-MCNC: 4.6 G/DL (ref 3.2–4.9)
ALBUMIN/GLOB SERPL: 1.5 G/DL
ALP SERPL-CCNC: 53 U/L (ref 30–99)
ALT SERPL-CCNC: 20 U/L (ref 2–50)
ANION GAP SERPL CALC-SCNC: 9 MMOL/L (ref 7–16)
AST SERPL-CCNC: 22 U/L (ref 12–45)
BASOPHILS # BLD AUTO: 0.8 % (ref 0–1.8)
BASOPHILS # BLD: 0.06 K/UL (ref 0–0.12)
BILIRUB SERPL-MCNC: 0.3 MG/DL (ref 0.1–1.5)
BUN SERPL-MCNC: 13 MG/DL (ref 8–22)
CALCIUM SERPL-MCNC: 9.4 MG/DL (ref 8.5–10.5)
CHLORIDE SERPL-SCNC: 105 MMOL/L (ref 96–112)
CO2 SERPL-SCNC: 22 MMOL/L (ref 20–33)
CREAT SERPL-MCNC: 0.9 MG/DL (ref 0.5–1.4)
EOSINOPHIL # BLD AUTO: 0.32 K/UL (ref 0–0.51)
EOSINOPHIL NFR BLD: 4.4 % (ref 0–6.9)
ERYTHROCYTE [DISTWIDTH] IN BLOOD BY AUTOMATED COUNT: 45.1 FL (ref 35.9–50)
FASTING STATUS PATIENT QL REPORTED: NORMAL
GLOBULIN SER CALC-MCNC: 3.1 G/DL (ref 1.9–3.5)
GLUCOSE SERPL-MCNC: 115 MG/DL (ref 65–99)
HCT VFR BLD AUTO: 42.6 % (ref 37–47)
HGB BLD-MCNC: 13.5 G/DL (ref 12–16)
IMM GRANULOCYTES # BLD AUTO: 0.01 K/UL (ref 0–0.11)
IMM GRANULOCYTES NFR BLD AUTO: 0.1 % (ref 0–0.9)
LYMPHOCYTES # BLD AUTO: 2.52 K/UL (ref 1–4.8)
LYMPHOCYTES NFR BLD: 35 % (ref 22–41)
MCH RBC QN AUTO: 29.2 PG (ref 27–33)
MCHC RBC AUTO-ENTMCNC: 31.7 G/DL (ref 33.6–35)
MCV RBC AUTO: 92 FL (ref 81.4–97.8)
MONOCYTES # BLD AUTO: 0.61 K/UL (ref 0–0.85)
MONOCYTES NFR BLD AUTO: 8.5 % (ref 0–13.4)
NEUTROPHILS # BLD AUTO: 3.69 K/UL (ref 2–7.15)
NEUTROPHILS NFR BLD: 51.2 % (ref 44–72)
NRBC # BLD AUTO: 0 K/UL
NRBC BLD-RTO: 0 /100 WBC
PLATELET # BLD AUTO: 309 K/UL (ref 164–446)
PMV BLD AUTO: 10.2 FL (ref 9–12.9)
POTASSIUM SERPL-SCNC: 4.1 MMOL/L (ref 3.6–5.5)
PROT SERPL-MCNC: 7.7 G/DL (ref 6–8.2)
RBC # BLD AUTO: 4.63 M/UL (ref 4.2–5.4)
SODIUM SERPL-SCNC: 136 MMOL/L (ref 135–145)
WBC # BLD AUTO: 7.2 K/UL (ref 4.8–10.8)

## 2021-05-05 PROCEDURE — 80053 COMPREHEN METABOLIC PANEL: CPT

## 2021-05-05 PROCEDURE — 71046 X-RAY EXAM CHEST 2 VIEWS: CPT

## 2021-05-05 PROCEDURE — 36415 COLL VENOUS BLD VENIPUNCTURE: CPT

## 2021-05-05 PROCEDURE — 85025 COMPLETE CBC W/AUTO DIFF WBC: CPT

## 2021-05-05 NOTE — TELEPHONE ENCOUNTER
----- Message from Catarina Gregorio P.A.-C. sent at 5/5/2021  2:29 PM PDT -----  Please call patient about their results.     Results showed: chest xray negative.     Thank you,    Catarina Gregorio PA-C

## 2021-05-06 ENCOUNTER — TELEPHONE (OUTPATIENT)
Dept: MEDICAL GROUP | Facility: PHYSICIAN GROUP | Age: 41
End: 2021-05-06

## 2021-06-09 ENCOUNTER — TELEMEDICINE (OUTPATIENT)
Dept: BEHAVIORAL HEALTH | Facility: CLINIC | Age: 41
End: 2021-06-09
Payer: COMMERCIAL

## 2021-06-09 DIAGNOSIS — F33.1 MODERATE RECURRENT MAJOR DEPRESSION (HCC): ICD-10-CM

## 2021-06-09 DIAGNOSIS — F50.2 BULIMIA NERVOSA, PURGING TYPE: ICD-10-CM

## 2021-06-09 DIAGNOSIS — F41.1 GENERALIZED ANXIETY DISORDER: ICD-10-CM

## 2021-06-09 PROCEDURE — 99214 OFFICE O/P EST MOD 30 MIN: CPT | Mod: 95,CR | Performed by: PSYCHIATRY & NEUROLOGY

## 2021-06-09 RX ORDER — NALTREXONE HYDROCHLORIDE 50 MG/1
50 TABLET, FILM COATED ORAL DAILY
Qty: 90 TABLET | Refills: 0 | Status: SHIPPED | OUTPATIENT
Start: 2021-06-09 | End: 2021-09-07

## 2021-06-09 RX ORDER — SERTRALINE HYDROCHLORIDE 100 MG/1
200 TABLET, FILM COATED ORAL DAILY
Qty: 180 TABLET | Refills: 0 | Status: SHIPPED | OUTPATIENT
Start: 2021-06-09 | End: 2021-09-07

## 2021-06-09 RX ORDER — BUPROPION HYDROCHLORIDE 300 MG/1
300 TABLET ORAL
Qty: 90 TABLET | Refills: 2 | Status: SHIPPED | OUTPATIENT
Start: 2021-06-09 | End: 2022-09-27 | Stop reason: SDUPTHER

## 2021-06-09 NOTE — PROGRESS NOTES
[facility] PSYCHIATRIC FOLLOW-UP NOTE    This provider informed the patient their medical records are totally confidential except for the use by other providers involved in their care, or if the patient signs a release, or to report instances of child or elder abuse, or if it is determined they are an immediate risk to harm themselves or others.  To avoid spread of covid-19 virus this appointment was conducted by telehealth.  The patient gave consent to have this follow up session by video telehealth.    Time at beginning of call:  2:30 PM    TOTAL FACE-TO-FACE TIME  30 minutes    CHIEF COMPLAINT       Having depression, generalized anxiety, panic attacks,  binging and purging.  HISTORY OF PRESENT ILLNESS       The patient is a  39yo W female and ED  who is followed by this provider for recurrent Major Depression and Generalized Anxiety Disorder with Panic Attacks.  She has had depressive episodes since she was 13yo which were exacerbated when her divorce was finalized in 2015 and she got shared custody of her 2 daughters.  Her depressive episodes have an atypical pattern of anhedonia, hypersomnia, variable appetite, and moderate psychomotor retardation.  She denies ever having an overt suicidal plan, intent, or attempt.  She is struggling emotionally and feels sad or angry and is very critical about herself.       She has a dysfunctional relationship with her mother's family and only saw her father on weekends.  Her father didn't like fat people and the patient was chubby growing up.  Her father had a heart attack.       She has generalized anxiety disorder with panic attacks with restlessness, excessive worry and concern, irritability, muscle tension in her neck, shoulders, and upper back, and occasionally she gets so anxious that her mind goes blank.  She has panic attacks 3 to 4 times a month.  She went on a trip with her mother who triggers her  "a lot and she was not as anxious as she was in the past.       She is self-critical and wishes she could have more fun with her daughters.  She has better alleviation of depression and anxiety on sertraline 200mg po daily combined with bupropion XL 300mg po daily.  However, she has been struggling lately with some \"unhealthy behaviors\" (binging/purging, picking).  She will be started on a trial of naltrexone 50mg po daily to help curb these behaviors.  She has struggled with bulimic symptoms since she was 15yo.  The naltrexone has decreased her unwanted behaviors.       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  She goes to orientation next week.       Her  has been able to move to [place] and she sees her 16yo daughter more often.  She butts heads with her daughter because they are very much alike.  She is starting to exercise and it helps her manage to keep herself together.        She had corona virus infection around [facility] and still has some brain fog but can get on her spin bike and does Kundalini yoga.  At times things move too fast in the ED.  PSYCHOSOCIAL CHANGES SINCE PREVIOUS CONTACT       The patient is about to orient for nursing school.  RESPONSE TO TREATMENT       The patient has less binging and purging.    PAST PSYCHIATRIC MEDICATIONS       Citalopram, bupropion XL, sertraline, naltrexone.  MEDICATION SIDE EFFECTS       None    MEDICAL REVIEW OF SYSTEMS:   Constitutional negative   Eyes negative   Ears/Nose/Mouth/Throat negative   Cardiovascular negative   Respiratory positive - breast lump   Gastrointestinal positive - GERD   Genitourinary negative   Muscular negative   Integumentary positive - (R) shoulder pain   Neurological negative   Endocrine positive - hyperlipidemia   Hematologic/Lymphatic positive - varicose veins (R) lower extremity        MENTAL STATUS EVALUATION  There were no vitals taken for this visit. "   Participation: Active verbal participation  Grooming:Neat  Orientation: Fully Oriented  Eye contact: Good  Behavior:Calm   Mood: Euthymic  Affect: Full range  Thought process: Logical  Thought content:  Within normal limits  Speech: Rate within normal limits  Perception:  Within normal limits  Memory:  No gross evidence of memory deficits  Insight: Good  Judgment: Good  Family/couple interaction observations:   Other:  Depression Screen (PHQ-2/PHQ-9) 8/5/2019 6/4/2020 6/30/2020   PHQ-2 Total Score 0 2 2   PHQ-9 Total Score - 11 7       Interpretation of PHQ-9 Total Score   Score Severity   1-4 No Depression   5-9 Mild Depression   10-14 Moderate Depression   15-19 Moderately Severe Depression   20-27 Severe Depression  Current risk:    Suicide: Low   Homicide: Not applicable   Self-harm: Low  Relapse: Moderate  Other:   Crisis Safety Plan reviewed?No  If evidence of imminent risk is present, intervention/plan:    Medical Records/Labs/Diagnostic Tests Reviewed: yes    Medical Records/Labs/Diagnostic Tests Ordered: no    DIAGNOSTIC IMPRESSIONS       (1) Major Depression, Recurrent, Moderate (F33.1)       (2) Generalized Anxiety Disorder with Panic Attacks (F41.1)       (3) Bulimia Nervosa (F50.2)    ASSESSMENT AND PLAN       Her binging and purging are compulsive behaviors under increased stress and low self esteem.  Her sertraline is 200mg po daily which should act against compulsive behaviors and she was started on naltrexone 50mg po daily to reduce craving and picking behavior.       Continue naltrexone 50mg po daily.       Continue sertraline 200mg po daily.       Continue bupropion XL 300mg po daily.       RTC to  Clinic in 2 months for 30 min follow up with this provider.       Time at end of call:  3:00 PM    30 minutes spent in psychotherapy  Topics addressed in psychotherapy include:  How she is very self-critical and her own worst enemy.  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

## 2021-06-25 ENCOUNTER — OFFICE VISIT (OUTPATIENT)
Dept: URGENT CARE | Facility: PHYSICIAN GROUP | Age: 41
End: 2021-06-25
Payer: COMMERCIAL

## 2021-06-25 VITALS
DIASTOLIC BLOOD PRESSURE: 82 MMHG | SYSTOLIC BLOOD PRESSURE: 128 MMHG | HEIGHT: 72 IN | BODY MASS INDEX: 25.73 KG/M2 | TEMPERATURE: 96.6 F | OXYGEN SATURATION: 100 % | WEIGHT: 190 LBS | HEART RATE: 52 BPM | RESPIRATION RATE: 18 BRPM

## 2021-06-25 DIAGNOSIS — K64.4 EXTERNAL HEMORRHOID: ICD-10-CM

## 2021-06-25 PROCEDURE — 99213 OFFICE O/P EST LOW 20 MIN: CPT | Performed by: PHYSICIAN ASSISTANT

## 2021-06-25 RX ORDER — HYDROCORTISONE ACETATE 25 MG/1
25 SUPPOSITORY RECTAL EVERY 12 HOURS
Qty: 12 SUPPOSITORY | Refills: 1 | Status: SHIPPED | OUTPATIENT
Start: 2021-06-25 | End: 2022-02-02

## 2021-06-25 RX ORDER — HYDROCORTISONE 25 MG/G
1 CREAM TOPICAL DAILY
Qty: 28 G | Refills: 0 | Status: SHIPPED | OUTPATIENT
Start: 2021-06-25 | End: 2022-02-02

## 2021-06-25 RX ORDER — LIDOCAINE HYDROCHLORIDE 30 MG/G
1 CREAM TOPICAL 3 TIMES DAILY PRN
Qty: 28 G | Refills: 1 | Status: SHIPPED | OUTPATIENT
Start: 2021-06-25 | End: 2022-02-02

## 2021-06-25 ASSESSMENT — ENCOUNTER SYMPTOMS
DIARRHEA: 0
ABDOMINAL PAIN: 0
NAUSEA: 0
FEVER: 0
CHILLS: 0
CONSTIPATION: 0
COUGH: 0
SHORTNESS OF BREATH: 0
MYALGIAS: 0
VOMITING: 0
HEADACHES: 0
EYE PAIN: 0
SORE THROAT: 0

## 2021-06-25 ASSESSMENT — FIBROSIS 4 INDEX: FIB4 SCORE: 0.6

## 2021-06-25 NOTE — PROGRESS NOTES
Subjective:   Caroline Grewal is a 40 y.o. female who presents for Hemorrhoids (not going away, painful not getting better, x1 week.)      HPI:  This is a pleasant 40-year-old female who has had an issue for the last week with external hemorrhoids, nonbleeding, waxing and waning painful.  She had similar issues in the past during pregnancy and has had them on again off again since.  Normally they resolve with conservative management of which sherrie and ice packs.  Her current hemorrhoid is nonreducible and painful.  Has not noticed any bleeding or bright red blood on toilet paper.  She notes that she does have an adequate fiber intake and adequate hydration.  She denies straining during bowel movements.    Review of Systems   Constitutional: Negative for chills and fever.   HENT: Negative for congestion, ear pain and sore throat.    Eyes: Negative for pain.   Respiratory: Negative for cough and shortness of breath.    Cardiovascular: Negative for chest pain.   Gastrointestinal: Negative for abdominal pain, constipation, diarrhea, nausea and vomiting.   Genitourinary: Negative for dysuria.   Musculoskeletal: Negative for myalgias.   Skin: Negative for rash.   Neurological: Negative for headaches.       Medications:    • albuterol Aers  • buPROPion  • hydrocortisone rectal Crea  • hydrocortisone Supp  • Lidocaine HCl Crea  • naltrexone Tabs  • norethindrone-ethinyl estradiol  • sertraline Tabs    Allergies: Dairy food allergy    Problem List: Caroline Grewal does not have any pertinent problems on file.    Surgical History:  Past Surgical History:   Procedure Laterality Date   • ABDOMINAL EXPLORATION      Abdominoplasty       Past Social Hx: Caroline Grewal  reports that she quit smoking about 3 years ago. Her smoking use included cigarettes. She has never used smokeless tobacco. She reports current alcohol use. She reports that she does not use drugs.     Past Family Hx:  Caroline Grewal family history  "includes Diabetes in her mother; Heart Disease in her father; No Known Problems in her sister.     Problem list, medications, and allergies reviewed by myself today in Epic.     Objective:     /82 (BP Location: Left arm, Patient Position: Sitting, BP Cuff Size: Adult)   Pulse (!) 52   Temp 35.9 °C (96.6 °F) (Temporal)   Resp 18   Ht 1.854 m (6' 1\")   Wt 86.2 kg (190 lb)   SpO2 100%   BMI 25.07 kg/m²     Physical Exam  Vitals reviewed.   Constitutional:       Appearance: Normal appearance.   HENT:      Head: Normocephalic and atraumatic.      Right Ear: External ear normal.      Left Ear: External ear normal.      Nose: Nose normal.      Mouth/Throat:      Mouth: Mucous membranes are moist.   Eyes:      Conjunctiva/sclera: Conjunctivae normal.   Cardiovascular:      Rate and Rhythm: Normal rate.   Pulmonary:      Effort: Pulmonary effort is normal.   Genitourinary:     Comments: Exam deferred  Skin:     General: Skin is warm and dry.      Capillary Refill: Capillary refill takes less than 2 seconds.   Neurological:      Mental Status: She is alert and oriented to person, place, and time.         Assessment/Plan:     Diagnosis and associated orders:     1. External hemorrhoid  REFERRAL TO GENERAL SURGERY    hydrocortisone rectal (PERIANAL) 2.5% Cream    hydrocortisone (ANUSOL-HC) 25 MG Suppos    Lidocaine HCl 3 % Cream      Comments/MDM:     • Discussed pathophysiology, conservative management, expectant management, management of thrombosed hemorrhoids, referral to general surgery, medication options.  Multiple meds sent to pharmacy, will let patient decide on what is acceptable based on cost is still may be prohibitively expensive.  Urgent referral placed to general surgery.         Differential diagnosis, natural history, supportive care, and indications for immediate follow-up discussed.    Advised the patient to follow-up with the primary care physician for recheck, reevaluation, and consideration of " further management.    Please note that this dictation was created using voice recognition software. I have made a reasonable attempt to correct obvious errors, but I expect that there are errors of grammar and possibly content that I did not discover before finalizing the note.    This note was electronically signed by Mario Alberto Jorgensen PA-C

## 2021-08-24 ENCOUNTER — PATIENT MESSAGE (OUTPATIENT)
Dept: BEHAVIORAL HEALTH | Facility: CLINIC | Age: 41
End: 2021-08-24

## 2021-08-25 ENCOUNTER — EH NON-PROVIDER (OUTPATIENT)
Dept: OCCUPATIONAL MEDICINE | Facility: CLINIC | Age: 41
End: 2021-08-25

## 2021-08-25 DIAGNOSIS — Z02.89 ENCOUNTER FOR OCCUPATIONAL HEALTH EXAMINATION INVOLVING RESPIRATOR: ICD-10-CM

## 2021-08-25 PROCEDURE — 94375 RESPIRATORY FLOW VOLUME LOOP: CPT | Performed by: NURSE PRACTITIONER

## 2021-09-27 ENCOUNTER — IMMUNIZATION (OUTPATIENT)
Dept: OCCUPATIONAL MEDICINE | Facility: CLINIC | Age: 41
End: 2021-09-27

## 2021-09-27 DIAGNOSIS — Z23 NEED FOR VACCINATION: Primary | ICD-10-CM

## 2021-09-27 PROCEDURE — 90686 IIV4 VACC NO PRSV 0.5 ML IM: CPT | Performed by: FAMILY MEDICINE

## 2021-10-07 ENCOUNTER — HOSPITAL ENCOUNTER (OUTPATIENT)
Dept: LAB | Facility: MEDICAL CENTER | Age: 41
End: 2021-10-07
Attending: PHYSICIAN ASSISTANT
Payer: COMMERCIAL

## 2021-10-07 DIAGNOSIS — E78.5 DYSLIPIDEMIA: ICD-10-CM

## 2021-10-07 DIAGNOSIS — E87.1 LOW SODIUM LEVELS: ICD-10-CM

## 2021-10-07 LAB
ANION GAP SERPL CALC-SCNC: 12 MMOL/L (ref 7–16)
BUN SERPL-MCNC: 18 MG/DL (ref 8–22)
CALCIUM SERPL-MCNC: 9.3 MG/DL (ref 8.5–10.5)
CHLORIDE SERPL-SCNC: 105 MMOL/L (ref 96–112)
CHOLEST SERPL-MCNC: 229 MG/DL (ref 100–199)
CO2 SERPL-SCNC: 21 MMOL/L (ref 20–33)
CREAT SERPL-MCNC: 0.91 MG/DL (ref 0.5–1.4)
FASTING STATUS PATIENT QL REPORTED: NORMAL
GLUCOSE SERPL-MCNC: 95 MG/DL (ref 65–99)
HDLC SERPL-MCNC: 69 MG/DL
LDLC SERPL CALC-MCNC: 144 MG/DL
POTASSIUM SERPL-SCNC: 4.1 MMOL/L (ref 3.6–5.5)
SODIUM SERPL-SCNC: 138 MMOL/L (ref 135–145)
TRIGL SERPL-MCNC: 81 MG/DL (ref 0–149)

## 2021-10-07 PROCEDURE — 80061 LIPID PANEL: CPT

## 2021-10-07 PROCEDURE — 80048 BASIC METABOLIC PNL TOTAL CA: CPT

## 2021-10-07 PROCEDURE — 36415 COLL VENOUS BLD VENIPUNCTURE: CPT

## 2021-10-12 ENCOUNTER — TELEMEDICINE (OUTPATIENT)
Dept: MEDICAL GROUP | Facility: PHYSICIAN GROUP | Age: 41
End: 2021-10-12
Payer: COMMERCIAL

## 2021-10-12 VITALS — HEIGHT: 72 IN | BODY MASS INDEX: 25.87 KG/M2 | RESPIRATION RATE: 12 BRPM | WEIGHT: 191 LBS

## 2021-10-12 DIAGNOSIS — Z91.89 OTHER SPECIFIED PERSONAL RISK FACTORS, NOT ELSEWHERE CLASSIFIED: ICD-10-CM

## 2021-10-12 DIAGNOSIS — E78.5 DYSLIPIDEMIA: ICD-10-CM

## 2021-10-12 PROCEDURE — 99213 OFFICE O/P EST LOW 20 MIN: CPT | Mod: 95 | Performed by: PHYSICIAN ASSISTANT

## 2021-10-12 RX ORDER — NALTREXONE HYDROCHLORIDE 50 MG/1
50 TABLET, FILM COATED ORAL
COMMUNITY
Start: 2021-09-21 | End: 2022-01-10

## 2021-10-12 ASSESSMENT — PATIENT HEALTH QUESTIONNAIRE - PHQ9
SUM OF ALL RESPONSES TO PHQ QUESTIONS 1-9: 6
9. THOUGHTS THAT YOU WOULD BE BETTER OFF DEAD, OR OF HURTING YOURSELF: NOT AT ALL
6. FEELING BAD ABOUT YOURSELF - OR THAT YOU ARE A FAILURE OR HAVE LET YOURSELF OR YOUR FAMILY DOWN: MORE THAN HALF THE DAYS
7. TROUBLE CONCENTRATING ON THINGS, SUCH AS READING THE NEWSPAPER OR WATCHING TELEVISION: NOT AT ALL
CLINICAL INTERPRETATION OF PHQ2 SCORE: 1
8. MOVING OR SPEAKING SO SLOWLY THAT OTHER PEOPLE COULD HAVE NOTICED. OR THE OPPOSITE, BEING SO FIGETY OR RESTLESS THAT YOU HAVE BEEN MOVING AROUND A LOT MORE THAN USUAL: NOT AT ALL
SUM OF ALL RESPONSES TO PHQ9 QUESTIONS 1 AND 2: 1
5. POOR APPETITE OR OVEREATING: NOT AT ALL
SUM OF ALL RESPONSES TO PHQ QUESTIONS 1-9: 9
4. FEELING TIRED OR HAVING LITTLE ENERGY: NEARLY EVERY DAY
5. POOR APPETITE OR OVEREATING: 0 - NOT AT ALL
2. FEELING DOWN, DEPRESSED, IRRITABLE, OR HOPELESS: SEVERAL DAYS
3. TROUBLE FALLING OR STAYING ASLEEP OR SLEEPING TOO MUCH: NOT AT ALL
1. LITTLE INTEREST OR PLEASURE IN DOING THINGS: NOT AT ALL

## 2021-10-12 ASSESSMENT — FIBROSIS 4 INDEX: FIB4 SCORE: 0.6

## 2021-10-12 NOTE — ASSESSMENT & PLAN NOTE
She's been doing really well with lifestyle modifications. She's lost 10 pounds.   Discussed continued lifestyle modifications vs medications. Discussed increased and moving toward plant based diet.     Dad history MI, she's been told he did not have high cholesterol. Maternal Gma history of stents in her 60s. Maternal Gma- passed away from MI in late 60's.

## 2021-10-12 NOTE — PROGRESS NOTES
Virtual Visit: Established Patient   This visit was conducted via Zoom using secure and encrypted videoconferencing technology. The patient was in a private location in the state of [place].    The patient's identity was confirmed and verbal consent was obtained for this virtual visit.    Subjective:   CC:   Chief Complaint   Patient presents with   • Lab Results   • Dyslipidemia       Caroline Grewal is a 40 y.o. female presenting for evaluation and management of:     Dyslipidemia      She's been doing really well with lifestyle modifications. She's lost 10 pounds.   Discussed continued lifestyle modifications vs medications. Discussed increased and moving toward plant based diet.     Dad history MI, she's been told he did not have high cholesterol. Maternal Gma history of stents in her 60s. Maternal Gma- passed away from MI in late 60's.       ROS   Denies any recent fevers or chills. No nausea or vomiting. No chest pains or shortness of breath.     Allergies   Allergen Reactions   • Dairy Food Allergy Rash     Pt states hands and legs       Current medicines (including changes today)  Current Outpatient Medications   Medication Sig Dispense Refill   • naltrexone (DEPADE) 50 MG Tab Take 50 mg by mouth every day.     • hydrocortisone rectal (PERIANAL) 2.5% Cream Insert 1 g into the rectum every day. 28 g 0   • hydrocortisone (ANUSOL-HC) 25 MG Suppos Insert 1 Suppository into the rectum every 12 hours. 12 Suppository 1   • Lidocaine HCl 3 % Cream Apply 1 Film topically 3 times a day as needed (pain). 28 g 1   • buPROPion (WELLBUTRIN XL) 300 MG XL tablet Take 1 tablet by mouth every day. 90 tablet 2   • albuterol 108 (90 Base) MCG/ACT Aero Soln inhalation aerosol INHALE 2 PUFFS BY MOUTH EVERY FOUR HOURS AS NEEDED FOR SHORTNESS OF BREATH. 1 Each 5     No current facility-administered medications for this visit.       Patient Active Problem List    Diagnosis Date Noted   • SOB (shortness of breath) 04/26/2021   • Low  "sodium levels 03/09/2021   • COVID-19 12/08/2020   • Deviated septum 10/16/2020   • Bulimia nervosa, purging type 10/16/2020   • Cough 08/20/2020   • Moderate recurrent major depression (HCC) 06/30/2020   • Generalized anxiety disorder 06/30/2020   • Abnormal blood chemistry 06/04/2020   • Dyslipidemia 06/04/2020   • Anxiety 09/13/2019       Family History   Problem Relation Age of Onset   • Diabetes Mother    • Heart Disease Father         MI   • No Known Problems Sister        She  has a past medical history of Anxiety, Depression, and GERD (gastroesophageal reflux disease).  She  has a past surgical history that includes abdominal exploration.       Objective:   Resp 12   Ht 1.854 m (6' 1\")   Wt 86.6 kg (191 lb)   LMP 09/17/2021   BMI 25.20 kg/m²     Physical Exam:  Constitutional: Alert, no distress, well-groomed.  Skin: No rashes in visible areas.  Eye: Round. Conjunctiva clear, lids normal. No icterus.   ENMT: Lips pink without lesions, good dentition, moist mucous membranes. Phonation normal.  Neck: No masses, no thyromegaly. Moves freely without pain.  Respiratory: Unlabored respiratory effort, no cough or audible wheeze  Psych: Alert and oriented x3, normal affect and mood.       Assessment and Plan:   The following treatment plan was discussed:     1. Dyslipidemia  2. Other specified personal risk factors, not elsewhere classified  Patient would like to move forward with continued lifestyle modifications. Has dropped 10 pounds, dropped LDL nearly 30 points in the last 6 months. FH heart disease, most likely genetic component. Could always consider medications in future if it does not continue to trend down nicely. Also offered CT calcium score for further information.   Other orders  - naltrexone (DEPADE) 50 MG Tab; Take 50 mg by mouth every day.        Follow-up: Return in about 6 months (around 4/12/2022) for Follow up on labs.        The patient verbalized agreement and understanding of current " plan. All questions and concerns were addressed at time of visit.    Please note that this dictation was created using voice recognition software. I have made every reasonable attempt to correct obvious errors, but I expect that there are errors of grammar and possibly content that I did not discover before finalizing the note.

## 2021-10-30 ENCOUNTER — OFFICE VISIT (OUTPATIENT)
Dept: URGENT CARE | Facility: PHYSICIAN GROUP | Age: 41
End: 2021-10-30
Payer: COMMERCIAL

## 2021-10-30 VITALS
HEIGHT: 72 IN | OXYGEN SATURATION: 96 % | WEIGHT: 190 LBS | SYSTOLIC BLOOD PRESSURE: 122 MMHG | HEART RATE: 55 BPM | BODY MASS INDEX: 25.73 KG/M2 | TEMPERATURE: 98.5 F | RESPIRATION RATE: 16 BRPM | DIASTOLIC BLOOD PRESSURE: 82 MMHG

## 2021-10-30 DIAGNOSIS — Z20.822 COVID-19 RULED OUT: ICD-10-CM

## 2021-10-30 DIAGNOSIS — J02.9 VIRAL PHARYNGITIS: ICD-10-CM

## 2021-10-30 LAB
EXTERNAL QUALITY CONTROL: NEGATIVE
SARS-COV+SARS-COV-2 AG RESP QL IA.RAPID: NORMAL

## 2021-10-30 PROCEDURE — 99213 OFFICE O/P EST LOW 20 MIN: CPT | Mod: CS | Performed by: STUDENT IN AN ORGANIZED HEALTH CARE EDUCATION/TRAINING PROGRAM

## 2021-10-30 PROCEDURE — 87426 SARSCOV CORONAVIRUS AG IA: CPT | Performed by: STUDENT IN AN ORGANIZED HEALTH CARE EDUCATION/TRAINING PROGRAM

## 2021-10-30 ASSESSMENT — FIBROSIS 4 INDEX: FIB4 SCORE: 0.62

## 2021-10-30 NOTE — PROGRESS NOTES
Subjective:   CHIEF COMPLAINT  Chief Complaint   Patient presents with   • Pharyngitis     x2 days, pt states sore throat, discomfort to swallow, headaches, fatigue, ear pain, nasal congestion        HPI  Caroline Grewal is a 41 y.o. female who presents c/o sore throat, congestion, b/l ear ache since yesterday.  Says the symptoms are mild.  She has tried vit c and zinc which have not helped.  She denies any associated symptoms of cough, wheezing or shortness of breath.  She is immunized against Covid.  She works in the ED.     REVIEW OF SYSTEMS  General: no fever or chills  GI: no nausea or vomiting  See HPI for further details.     PAST MEDICAL HISTORY  Patient Active Problem List    Diagnosis Date Noted   • SOB (shortness of breath) 04/26/2021   • Low sodium levels 03/09/2021   • COVID-19 12/08/2020   • Deviated septum 10/16/2020   • Bulimia nervosa, purging type 10/16/2020   • Cough 08/20/2020   • Moderate recurrent major depression (HCC) 06/30/2020   • Generalized anxiety disorder 06/30/2020   • Abnormal blood chemistry 06/04/2020   • Dyslipidemia 06/04/2020   • Anxiety 09/13/2019       SURGICAL HISTORY   has a past surgical history that includes abdominal exploration.    ALLERGIES  Allergies   Allergen Reactions   • Dairy Food Allergy Rash     Pt states hands and legs       CURRENT MEDICATIONS  Home Medications     Reviewed by Fortunato Barth D.O. (Physician) on 10/30/21 at 0955  Med List Status: <None>   Medication Last Dose Status   albuterol 108 (90 Base) MCG/ACT Aero Soln inhalation aerosol Taking Active   buPROPion (WELLBUTRIN XL) 300 MG XL tablet Taking Active   hydrocortisone (ANUSOL-HC) 25 MG Suppos Taking Active   hydrocortisone rectal (PERIANAL) 2.5% Cream Taking Active   Lidocaine HCl 3 % Cream Taking Active   naltrexone (DEPADE) 50 MG Tab Taking Active                SOCIAL HISTORY  Social History     Tobacco Use   • Smoking status: Former Smoker     Types: Cigarettes     Quit date: 2018      "Years since quitting: 3.8   • Smokeless tobacco: Never Used   Vaping Use   • Vaping Use: Never used   Substance and Sexual Activity   • Alcohol use: Yes   • Drug use: No   • Sexual activity: Yes     Partners: Male     Birth control/protection: Pill       FAMILY HISTORY  Family History   Problem Relation Age of Onset   • Diabetes Mother    • Heart Disease Father         MI   • No Known Problems Sister           Objective:   PHYSICAL EXAM  VITAL SIGNS: /82   Pulse (!) 55   Temp 36.9 °C (98.5 °F) (Temporal)   Resp 16   Ht 1.854 m (6' 1\")   Wt 86.2 kg (190 lb)   SpO2 96%   BMI 25.07 kg/m²     Gen: no acute distress, normal voice  Skin: dry, intact, moist mucosal membranes  ENT: TMs clear that bilaterally without erythema, bulging or effusion.  Very mild pharyngeal erythema without exudates.  Uvula midline.  Lungs: CTAB w/ symmetric expansion  CV: RRR w/o murmurs or clicks  Psych: normal affect, normal judgement, alert, awake    POC Covid: Negative    Assessment/Plan:     1. Viral pharyngitis  COVID/SARS CoV-2 PCR    POCT SARS-COV Antigen [facility] Manual Result   2. COVID-19 ruled out     Signs and symptoms are consistent with a viral upper respiratory tract infection.  POC Covid was negative as expected.  Too early to definitively test so patient was instructed return to clinic within 24 to 48 hours for PCR test.  She is immunized against Covid.  -Ordered Covid.  Results will be sent through [employer].  -Instructed to quarantine until Covid results have been returned  -Encouraged hydration and symptomatic treatment with Tylenol/ibuprofen prn  -Discussed red flags and return precautions.  Patient verbalized their understanding.  All questions were answered.    Differential diagnosis, natural history, supportive care, and indications for immediate follow-up discussed. Patient agrees with the plan of care.    Follow-up as needed if symptoms worsen or fail to improve to PCP, Urgent care or Emergency Room.       Please " note that this dictation was created using voice recognition software. I have made a reasonable attempt to correct obvious errors, but I expect that there are errors of grammar and possibly content that I did not discover before finalizing the note.

## 2021-11-12 PROCEDURE — RXMED WILLOW AMBULATORY MEDICATION CHARGE: Performed by: INTERNAL MEDICINE

## 2021-11-13 ENCOUNTER — PHARMACY VISIT (OUTPATIENT)
Dept: PHARMACY | Facility: MEDICAL CENTER | Age: 41
End: 2021-11-13
Payer: COMMERCIAL

## 2022-01-10 RX ORDER — SERTRALINE HYDROCHLORIDE 100 MG/1
TABLET, FILM COATED ORAL
Qty: 180 TABLET | Refills: 0 | Status: SHIPPED | OUTPATIENT
Start: 2022-01-10 | End: 2022-09-27 | Stop reason: SDUPTHER

## 2022-01-10 RX ORDER — NALTREXONE HYDROCHLORIDE 50 MG/1
TABLET, FILM COATED ORAL
Qty: 90 TABLET | Refills: 0 | Status: SHIPPED | OUTPATIENT
Start: 2022-01-10 | End: 2022-04-10

## 2022-02-02 ENCOUNTER — HOSPITAL ENCOUNTER (OUTPATIENT)
Dept: LAB | Facility: MEDICAL CENTER | Age: 42
End: 2022-02-02
Attending: NURSE PRACTITIONER
Payer: COMMERCIAL

## 2022-02-02 ENCOUNTER — OFFICE VISIT (OUTPATIENT)
Dept: MEDICAL GROUP | Facility: PHYSICIAN GROUP | Age: 42
End: 2022-02-02
Payer: COMMERCIAL

## 2022-02-02 VITALS
WEIGHT: 193 LBS | RESPIRATION RATE: 20 BRPM | OXYGEN SATURATION: 99 % | BODY MASS INDEX: 26.14 KG/M2 | HEART RATE: 68 BPM | HEIGHT: 72 IN | SYSTOLIC BLOOD PRESSURE: 100 MMHG | DIASTOLIC BLOOD PRESSURE: 60 MMHG | TEMPERATURE: 98.2 F

## 2022-02-02 DIAGNOSIS — Z11.59 NEED FOR HEPATITIS B SCREENING TEST: ICD-10-CM

## 2022-02-02 DIAGNOSIS — Z11.1 SCREENING-PULMONARY TB: ICD-10-CM

## 2022-02-02 DIAGNOSIS — E78.5 DYSLIPIDEMIA: ICD-10-CM

## 2022-02-02 DIAGNOSIS — Z86.19 HISTORY OF VARICELLA AS A CHILD: ICD-10-CM

## 2022-02-02 DIAGNOSIS — F33.1 MODERATE RECURRENT MAJOR DEPRESSION (HCC): ICD-10-CM

## 2022-02-02 DIAGNOSIS — Z13.1 ENCOUNTER FOR SCREENING FOR DIABETES MELLITUS: ICD-10-CM

## 2022-02-02 PROBLEM — R79.9 ABNORMAL BLOOD CHEMISTRY: Status: RESOLVED | Noted: 2020-06-04 | Resolved: 2022-02-02

## 2022-02-02 PROBLEM — R05.9 COUGH: Status: RESOLVED | Noted: 2020-08-20 | Resolved: 2022-02-02

## 2022-02-02 PROBLEM — R06.02 SOB (SHORTNESS OF BREATH): Status: RESOLVED | Noted: 2021-04-26 | Resolved: 2022-02-02

## 2022-02-02 LAB — HBV CORE AB SERPL QL IA: NONREACTIVE

## 2022-02-02 PROCEDURE — 86787 VARICELLA-ZOSTER ANTIBODY: CPT

## 2022-02-02 PROCEDURE — 36415 COLL VENOUS BLD VENIPUNCTURE: CPT

## 2022-02-02 PROCEDURE — 86704 HEP B CORE ANTIBODY TOTAL: CPT

## 2022-02-02 PROCEDURE — 86480 TB TEST CELL IMMUN MEASURE: CPT

## 2022-02-02 PROCEDURE — 99214 OFFICE O/P EST MOD 30 MIN: CPT | Performed by: NURSE PRACTITIONER

## 2022-02-02 RX ORDER — LISDEXAMFETAMINE DIMESYLATE 20 MG/1
CAPSULE ORAL
COMMUNITY
Start: 2022-01-27 | End: 2022-08-11 | Stop reason: SDUPTHER

## 2022-02-02 ASSESSMENT — FIBROSIS 4 INDEX: FIB4 SCORE: 0.62

## 2022-02-02 ASSESSMENT — PATIENT HEALTH QUESTIONNAIRE - PHQ9: CLINICAL INTERPRETATION OF PHQ2 SCORE: 0

## 2022-02-02 NOTE — LETTER
February 2, 2022    To Whom It May Concern:         This is confirmation that Caroline Grewal attended her scheduled appointment with CAMILA Portillo on 2/02/22. 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         If you have any questions please do not hesitate to call me at the phone number listed below.    Sincerely,          SLOAN [email or web address].  [phone]

## 2022-02-02 NOTE — ASSESSMENT & PLAN NOTE
"Chronic, ongoing.  Not taking any cholesterol lowering medications.  Reports diet is \"overall healthy\".   She is following a low-cholesterol diet.  She is exercising regularly.  She is not taking ASA daily.  She denies dizziness, claudication, or chest pain.  Due for updated lab work April 2022.     7/10/2020 08:46 3/3/2021 08:41 10/7/2021 08:38   Cholesterol,Tot 217 (H) 255 (H) 229 (H)   Triglycerides 194 (H) 93 81   HDL 50 66 69    (H) 170 (H) 144 (H)     "

## 2022-02-02 NOTE — ASSESSMENT & PLAN NOTE
Chronic and ongoing. Currently taking Bupropion 300 mg XL daily and Sertraline 100 mg daily. She is working with a Therapist and Psychiatrist and they are managing her medications. She states that the medication is working well. Denies any thoughts of suicide or homicide.

## 2022-02-02 NOTE — PROGRESS NOTES
"Subjective  Chief Complaint  Establish care to manage her chronic conditions    History of Present Illness  Caroline Grewal is a 41 y.o. female. This patient is here today to establish care.  Her prior PCP was MURPHY Thorpe.    Dyslipidemia  Chronic, ongoing.  Not taking any cholesterol lowering medications.  Reports diet is \"overall healthy\".   She is following a low-cholesterol diet.  She is exercising regularly.  She is not taking ASA daily.  She denies dizziness, claudication, or chest pain.  Due for updated lab work April 2022.     7/10/2020 08:46 3/3/2021 08:41 10/7/2021 08:38   Cholesterol,Tot 217 (H) 255 (H) 229 (H)   Triglycerides 194 (H) 93 81   HDL 50 66 69    (H) 170 (H) 144 (H)       Moderate recurrent major depression (HCC)  Chronic and ongoing. Currently taking Bupropion 300 mg XL daily and Sertraline 100 mg daily. She is working with a Therapist and Psychiatrist and they are managing her medications. She states that the medication is working well. Denies any thoughts of suicide or homicide.    Past Medical History    Allergies: Dairy food allergy  Past Medical History:   Diagnosis Date   • Abnormal blood chemistry 6/4/2020   • Anxiety    • Cough 8/20/2020   • Depression    • GERD (gastroesophageal reflux disease)    • SOB (shortness of breath) 4/26/2021     Past Surgical History:   Procedure Laterality Date   • ABDOMINAL EXPLORATION      Abdominoplasty     Current Outpatient Medications Ordered in Epic   Medication Sig Dispense Refill   • VYVANSE 20 MG Cap TAKE 1 CAPSULE ORALLY EVERY MORNING AND 1 CAPSULE ORALLY IN THE AFTERNOON     • sertraline (ZOLOFT) 100 MG Tab TAKE 2 TABLETS BY MOUTH EVERY  Tablet 0   • naltrexone (DEPADE) 50 MG Tab TAKE 1 TABLET BY MOUTH EVERY DAY 90 Tablet 0   • buPROPion (WELLBUTRIN XL) 300 MG XL tablet Take 1 tablet by mouth every day. 90 tablet 2   • albuterol 108 (90 Base) MCG/ACT Aero Soln inhalation aerosol INHALE 2 PUFFS BY MOUTH EVERY FOUR HOURS " "AS NEEDED FOR SHORTNESS OF BREATH. 1 Each 5     No current Epic-ordered facility-administered medications on file.     Family History:    Family History   Problem Relation Age of Onset   • Diabetes Mother    • Heart Disease Father         MI   • No Known Problems Sister       Personal/Social History:    Social History     Tobacco Use   • Smoking status: Former Smoker     Types: Cigarettes     Quit date:      Years since quittin.0   • Smokeless tobacco: Never Used   Vaping Use   • Vaping Use: Never used   Substance Use Topics   • Alcohol use: Yes     Comment: Occasionally    • Drug use: No     Social History     Social History Narrative   • Not on file      Review of Systems:     General: Negative for fever/chills and unexpected weight change.    Eyes:  Negative for vision changes, eye pain.   Respiratory:  Negative for cough and dyspnea.     Cardiovascular:  Negative for chest pain and palpitations.   Musculoskeletal:  Negative for myalgias.    Skin:  Negative for rash.    Neurological:  Negative for numbness/tingling and headaches.     Objective  Physical Exam:   /60 (BP Location: Left arm, Patient Position: Sitting, BP Cuff Size: Adult)   Pulse 68   Temp 36.8 °C (98.2 °F) (Temporal)   Resp 20   Ht 1.854 m (6' 1\")   Wt 87.5 kg (193 lb)   SpO2 99%  Body mass index is 25.46 kg/m².  General:  Alert and oriented.  Well appearing.  NAD.  Head:  Normocephalic.   Eyes:  Eyes conjunctiva clear lids without ptosis, pupils equal and reactive to light accommodation.    Neck: Supple without JVD. No lymphadenopathy.  Pulmonary:  Normal effort.  Clear to ausculation without rales, ronchi, or wheezing.  Cardiovascular:  Regular rate and rhythm without murmur, rubs or gallop.  Radial pulses are intact and equal bilaterally.  Musculoskeletal:  No extremity cyanosis, clubbing, or edema.  Skin:  Warm and dry.  No obvious lesions.  Psych: Normal mood and affect. Alert and oriented x3. Judgment and insight is " normal.      Assessment/Plan   1. Dyslipidemia  Chronic and ongoing.  Continue to take Omega 3 supplement.  Educated on a healthy diet and exercise.  Due for updated labs April 2022.  - Lipid Profile; Future    2. Moderate recurrent major depression (HCC)  Chronic and ongoing.  Continue to take Bupropion 300 mg XL daily.  Continue to take Sertraline 100 mg daily.  Continue to follow up with Therapist and Psychiatrist.    3. Encounter for screening for diabetes mellitus  Due for updated labs April 2022.  - Comp Metabolic Panel; Future    4. 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  - VARICELLA ZOSTER IGG AB; Future    5. Screening-pulmonary TB  Requesting lab work for her [employer] school, labs ordered at this time.  - Quantiferon Gold TB (PPD); Future    6. 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  - HEP B CORE AB TOTAL; Future      Health Maintenance: Completed    Return if symptoms worsen or fail to improve.    Please note that this dictation was created using voice recognition software. I have made every reasonable attempt to correct obvious errors, but I expect that there are errors of grammar and possibly content that I did not discover before finalizing the note.    REBECCA Bender  [facility]

## 2022-02-03 LAB — VZV IGG SER IA-ACNC: 2.88

## 2022-02-04 LAB
GAMMA INTERFERON BACKGROUND BLD IA-ACNC: 0.03 IU/ML
M TB IFN-G BLD-IMP: NEGATIVE
M TB IFN-G CD4+ BCKGRND COR BLD-ACNC: 0.06 IU/ML
MITOGEN IGNF BCKGRD COR BLD-ACNC: >10 IU/ML
QFT TB2 - NIL TBQ2: 0.01 IU/ML

## 2022-02-10 ENCOUNTER — NON-PROVIDER VISIT (OUTPATIENT)
Dept: MEDICAL GROUP | Facility: PHYSICIAN GROUP | Age: 42
End: 2022-02-10
Payer: COMMERCIAL

## 2022-02-10 DIAGNOSIS — Z23 NEED FOR VACCINATION: ICD-10-CM

## 2022-02-10 PROCEDURE — 90471 IMMUNIZATION ADMIN: CPT | Performed by: NURSE PRACTITIONER

## 2022-02-10 PROCEDURE — 90746 HEPB VACCINE 3 DOSE ADULT IM: CPT | Performed by: NURSE PRACTITIONER

## 2022-02-10 NOTE — NON-PROVIDER
"Caroline Grewal is a 41 y.o. female here for a non-provider visit for:   HEPATITIS B 1 of 3    Reason for immunization: continue or complete series started at the office  Immunization records indicate need for vaccine: Yes  Minimum interval has been met for this vaccine: No  ABN completed: Yes    VIS Dated 08/15/2019 was given to patient: Yes  All IAC Questionnaire questions were answered \"No.\"    Patient tolerated injection and no adverse effects were observed or reported: Yes    Pt scheduled for next dose in series: Yes    "

## 2022-04-07 ENCOUNTER — NON-PROVIDER VISIT (OUTPATIENT)
Dept: MEDICAL GROUP | Facility: PHYSICIAN GROUP | Age: 42
End: 2022-04-07
Payer: COMMERCIAL

## 2022-04-07 DIAGNOSIS — Z23 NEED FOR VACCINATION: ICD-10-CM

## 2022-04-07 PROCEDURE — 90746 HEPB VACCINE 3 DOSE ADULT IM: CPT | Performed by: FAMILY MEDICINE

## 2022-04-07 PROCEDURE — 90471 IMMUNIZATION ADMIN: CPT | Performed by: FAMILY MEDICINE

## 2022-04-07 NOTE — PROGRESS NOTES
"Caroline Grewal is a 41 y.o. female here for a non-provider visit for:   HEPATITIS B Second    Reason for immunization: lack of immunity demonstrated on prior labs or testing  Immunization records indicate need for vaccine: No  Minimum interval has been met for this vaccine: Yes  ABN completed: Yes    VIS Dated  08/15/2019 was given to patient: Yes  All IAC Questionnaire questions were answered \"No.\"    Patient tolerated injection and no adverse effects were observed or reported: Yes    Pt scheduled for next dose in series: Not Indicated  "

## 2022-07-12 ENCOUNTER — HOSPITAL ENCOUNTER (OUTPATIENT)
Dept: LAB | Facility: MEDICAL CENTER | Age: 42
End: 2022-07-12
Attending: NURSE PRACTITIONER
Payer: COMMERCIAL

## 2022-07-12 ENCOUNTER — HOSPITAL ENCOUNTER (OUTPATIENT)
Dept: LAB | Facility: MEDICAL CENTER | Age: 42
End: 2022-07-12
Attending: HOSPITALIST
Payer: COMMERCIAL

## 2022-07-12 DIAGNOSIS — E78.5 DYSLIPIDEMIA: ICD-10-CM

## 2022-07-12 DIAGNOSIS — Z13.1 ENCOUNTER FOR SCREENING FOR DIABETES MELLITUS: ICD-10-CM

## 2022-07-12 DIAGNOSIS — Z11.59 NEED FOR HEPATITIS B SCREENING TEST: ICD-10-CM

## 2022-07-12 LAB
ALBUMIN SERPL BCP-MCNC: 4.9 G/DL (ref 3.2–4.9)
ALBUMIN/GLOB SERPL: 1.8 G/DL
ALP SERPL-CCNC: 53 U/L (ref 30–99)
ALT SERPL-CCNC: 17 U/L (ref 2–50)
ANION GAP SERPL CALC-SCNC: 11 MMOL/L (ref 7–16)
AST SERPL-CCNC: 22 U/L (ref 12–45)
BASOPHILS # BLD AUTO: 1.8 % (ref 0–1.8)
BASOPHILS # BLD: 0.12 K/UL (ref 0–0.12)
BILIRUB SERPL-MCNC: 0.3 MG/DL (ref 0.1–1.5)
BUN SERPL-MCNC: 14 MG/DL (ref 8–22)
CALCIUM SERPL-MCNC: 9.7 MG/DL (ref 8.5–10.5)
CHLORIDE SERPL-SCNC: 102 MMOL/L (ref 96–112)
CHOLEST SERPL-MCNC: 225 MG/DL (ref 100–199)
CO2 SERPL-SCNC: 22 MMOL/L (ref 20–33)
COMMENT 1642: NORMAL
CREAT SERPL-MCNC: 1.01 MG/DL (ref 0.5–1.4)
EOSINOPHIL # BLD AUTO: 0.42 K/UL (ref 0–0.51)
EOSINOPHIL NFR BLD: 6.3 % (ref 0–6.9)
ERYTHROCYTE [DISTWIDTH] IN BLOOD BY AUTOMATED COUNT: 47.6 FL (ref 35.9–50)
FASTING STATUS PATIENT QL REPORTED: NORMAL
GFR SERPLBLD CREATININE-BSD FMLA CKD-EPI: 72 ML/MIN/1.73 M 2
GLOBULIN SER CALC-MCNC: 2.7 G/DL (ref 1.9–3.5)
GLUCOSE SERPL-MCNC: 86 MG/DL (ref 65–99)
HBV SURFACE AB SERPL IA-ACNC: 2679 MIU/ML (ref 0–10)
HCT VFR BLD AUTO: 36.4 % (ref 37–47)
HDLC SERPL-MCNC: 66 MG/DL
HGB BLD-MCNC: 10.7 G/DL (ref 12–16)
IMM GRANULOCYTES # BLD AUTO: 0.02 K/UL (ref 0–0.11)
IMM GRANULOCYTES NFR BLD AUTO: 0.3 % (ref 0–0.9)
LDLC SERPL CALC-MCNC: 142 MG/DL
LYMPHOCYTES # BLD AUTO: 1.96 K/UL (ref 1–4.8)
LYMPHOCYTES NFR BLD: 29.6 % (ref 22–41)
MCH RBC QN AUTO: 24.9 PG (ref 27–33)
MCHC RBC AUTO-ENTMCNC: 29.4 G/DL (ref 33.6–35)
MCV RBC AUTO: 84.7 FL (ref 81.4–97.8)
MONOCYTES # BLD AUTO: 0.59 K/UL (ref 0–0.85)
MONOCYTES NFR BLD AUTO: 8.9 % (ref 0–13.4)
MORPHOLOGY BLD-IMP: NORMAL
NEUTROPHILS # BLD AUTO: 3.52 K/UL (ref 2–7.15)
NEUTROPHILS NFR BLD: 53.1 % (ref 44–72)
NRBC # BLD AUTO: 0 K/UL
NRBC BLD-RTO: 0 /100 WBC
PLATELET # BLD AUTO: 369 K/UL (ref 164–446)
PLATELET BLD QL SMEAR: NORMAL
PMV BLD AUTO: 10.2 FL (ref 9–12.9)
POTASSIUM SERPL-SCNC: 4.1 MMOL/L (ref 3.6–5.5)
PROT SERPL-MCNC: 7.6 G/DL (ref 6–8.2)
RBC # BLD AUTO: 4.3 M/UL (ref 4.2–5.4)
RBC BLD AUTO: NORMAL
SODIUM SERPL-SCNC: 135 MMOL/L (ref 135–145)
TRIGL SERPL-MCNC: 84 MG/DL (ref 0–149)
WBC # BLD AUTO: 6.6 K/UL (ref 4.8–10.8)

## 2022-07-12 PROCEDURE — 80061 LIPID PANEL: CPT

## 2022-07-12 PROCEDURE — 36415 COLL VENOUS BLD VENIPUNCTURE: CPT

## 2022-07-12 PROCEDURE — 85025 COMPLETE CBC W/AUTO DIFF WBC: CPT

## 2022-07-12 PROCEDURE — 86706 HEP B SURFACE ANTIBODY: CPT

## 2022-07-12 PROCEDURE — 80053 COMPREHEN METABOLIC PANEL: CPT

## 2022-07-20 DIAGNOSIS — N92.0 MENORRHAGIA WITH REGULAR CYCLE: ICD-10-CM

## 2022-07-26 RX ORDER — LISDEXAMFETAMINE DIMESYLATE 20 MG/1
CAPSULE ORAL
Qty: 180 CAPSULE | OUTPATIENT
Start: 2022-07-26

## 2022-08-11 DIAGNOSIS — F90.0 ADHD (ATTENTION DEFICIT HYPERACTIVITY DISORDER), INATTENTIVE TYPE: ICD-10-CM

## 2022-08-11 NOTE — TELEPHONE ENCOUNTER
Pt has an upcoming appt. with you on 09/23/2022 and she is out of medication today she would like a refill sent over to last her till her appt.      eceived request via: Patient    Was the patient seen in the last year in this department? Yes    Does the patient have an active prescription (recently filled or refills available) for medication(s) requested? No

## 2022-08-15 DIAGNOSIS — F90.0 ADHD (ATTENTION DEFICIT HYPERACTIVITY DISORDER), INATTENTIVE TYPE: ICD-10-CM

## 2022-08-15 RX ORDER — LISDEXAMFETAMINE DIMESYLATE 20 MG/1
CAPSULE ORAL
Qty: 60 CAPSULE | Refills: 0 | Status: CANCELLED | OUTPATIENT
Start: 2022-08-15 | End: 2022-09-15

## 2022-08-15 RX ORDER — LISDEXAMFETAMINE DIMESYLATE 20 MG/1
CAPSULE ORAL
Qty: 30 CAPSULE | Refills: 0 | Status: SHIPPED | OUTPATIENT
Start: 2022-08-15 | End: 2022-08-16

## 2022-08-15 NOTE — TELEPHONE ENCOUNTER
Pharmacy is asking we resend prescription with a day supply and correct updated quantity       Received request via: Pharmacy    Was the patient seen in the last year in this department? Yes    Does the patient have an active prescription (recently filled or refills available) for medication(s) requested? No

## 2022-08-16 DIAGNOSIS — F50.2 BULIMIA NERVOSA, PURGING TYPE: ICD-10-CM

## 2022-08-16 RX ORDER — LISDEXAMFETAMINE DIMESYLATE 20 MG/1
CAPSULE ORAL
Qty: 60 CAPSULE | Refills: 0 | Status: SHIPPED | OUTPATIENT
Start: 2022-08-16 | End: 2022-09-16

## 2022-08-30 ENCOUNTER — HOSPITAL ENCOUNTER (EMERGENCY)
Facility: MEDICAL CENTER | Age: 42
End: 2022-08-31
Attending: EMERGENCY MEDICINE
Payer: COMMERCIAL

## 2022-08-30 ENCOUNTER — APPOINTMENT (OUTPATIENT)
Dept: RADIOLOGY | Facility: MEDICAL CENTER | Age: 42
End: 2022-08-30
Attending: EMERGENCY MEDICINE
Payer: COMMERCIAL

## 2022-08-30 DIAGNOSIS — N93.9 VAGINAL BLEEDING: ICD-10-CM

## 2022-08-30 LAB
ALBUMIN SERPL BCP-MCNC: 4.1 G/DL (ref 3.2–4.9)
ALBUMIN/GLOB SERPL: 1.8 G/DL
ALP SERPL-CCNC: 45 U/L (ref 30–99)
ALT SERPL-CCNC: 15 U/L (ref 2–50)
ANION GAP SERPL CALC-SCNC: 11 MMOL/L (ref 7–16)
APPEARANCE UR: CLEAR
AST SERPL-CCNC: 21 U/L (ref 12–45)
BACTERIA #/AREA URNS HPF: NEGATIVE /HPF
BASOPHILS # BLD AUTO: 1.5 % (ref 0–1.8)
BASOPHILS # BLD: 0.13 K/UL (ref 0–0.12)
BILIRUB SERPL-MCNC: <0.2 MG/DL (ref 0.1–1.5)
BILIRUB UR QL STRIP.AUTO: NEGATIVE
BUN SERPL-MCNC: 16 MG/DL (ref 8–22)
CALCIUM SERPL-MCNC: 9.4 MG/DL (ref 8.5–10.5)
CHLORIDE SERPL-SCNC: 108 MMOL/L (ref 96–112)
CO2 SERPL-SCNC: 22 MMOL/L (ref 20–33)
COLOR UR: YELLOW
CREAT SERPL-MCNC: 0.85 MG/DL (ref 0.5–1.4)
EOSINOPHIL # BLD AUTO: 0.51 K/UL (ref 0–0.51)
EOSINOPHIL NFR BLD: 6 % (ref 0–6.9)
EPI CELLS #/AREA URNS HPF: NEGATIVE /HPF
ERYTHROCYTE [DISTWIDTH] IN BLOOD BY AUTOMATED COUNT: 54.4 FL (ref 35.9–50)
GFR SERPLBLD CREATININE-BSD FMLA CKD-EPI: 89 ML/MIN/1.73 M 2
GLOBULIN SER CALC-MCNC: 2.3 G/DL (ref 1.9–3.5)
GLUCOSE SERPL-MCNC: 94 MG/DL (ref 65–99)
GLUCOSE UR STRIP.AUTO-MCNC: NEGATIVE MG/DL
HCG SERPL QL: NEGATIVE
HCT VFR BLD AUTO: 33.2 % (ref 37–47)
HGB BLD-MCNC: 10.5 G/DL (ref 12–16)
HYALINE CASTS #/AREA URNS LPF: ABNORMAL /LPF
IMM GRANULOCYTES # BLD AUTO: 0.02 K/UL (ref 0–0.11)
IMM GRANULOCYTES NFR BLD AUTO: 0.2 % (ref 0–0.9)
KETONES UR STRIP.AUTO-MCNC: NEGATIVE MG/DL
LEUKOCYTE ESTERASE UR QL STRIP.AUTO: NEGATIVE
LIPASE SERPL-CCNC: 37 U/L (ref 11–82)
LYMPHOCYTES # BLD AUTO: 3.03 K/UL (ref 1–4.8)
LYMPHOCYTES NFR BLD: 35.8 % (ref 22–41)
MCH RBC QN AUTO: 27 PG (ref 27–33)
MCHC RBC AUTO-ENTMCNC: 31.6 G/DL (ref 33.6–35)
MCV RBC AUTO: 85.3 FL (ref 81.4–97.8)
MICRO URNS: ABNORMAL
MONOCYTES # BLD AUTO: 0.89 K/UL (ref 0–0.85)
MONOCYTES NFR BLD AUTO: 10.5 % (ref 0–13.4)
NEUTROPHILS # BLD AUTO: 3.88 K/UL (ref 2–7.15)
NEUTROPHILS NFR BLD: 46 % (ref 44–72)
NITRITE UR QL STRIP.AUTO: NEGATIVE
NRBC # BLD AUTO: 0 K/UL
NRBC BLD-RTO: 0 /100 WBC
PH UR STRIP.AUTO: 6 [PH] (ref 5–8)
PLATELET # BLD AUTO: 299 K/UL (ref 164–446)
PMV BLD AUTO: 9.6 FL (ref 9–12.9)
POTASSIUM SERPL-SCNC: 3.8 MMOL/L (ref 3.6–5.5)
PROT SERPL-MCNC: 6.4 G/DL (ref 6–8.2)
PROT UR QL STRIP: NEGATIVE MG/DL
RBC # BLD AUTO: 3.89 M/UL (ref 4.2–5.4)
RBC # URNS HPF: ABNORMAL /HPF
RBC UR QL AUTO: ABNORMAL
SODIUM SERPL-SCNC: 141 MMOL/L (ref 135–145)
SP GR UR STRIP.AUTO: 1.01
UROBILINOGEN UR STRIP.AUTO-MCNC: 0.2 MG/DL
WBC # BLD AUTO: 8.5 K/UL (ref 4.8–10.8)
WBC #/AREA URNS HPF: ABNORMAL /HPF

## 2022-08-30 PROCEDURE — 36415 COLL VENOUS BLD VENIPUNCTURE: CPT

## 2022-08-30 PROCEDURE — 99284 EMERGENCY DEPT VISIT MOD MDM: CPT

## 2022-08-30 PROCEDURE — 83690 ASSAY OF LIPASE: CPT

## 2022-08-30 PROCEDURE — 85025 COMPLETE CBC W/AUTO DIFF WBC: CPT

## 2022-08-30 PROCEDURE — 76856 US EXAM PELVIC COMPLETE: CPT

## 2022-08-30 PROCEDURE — 81001 URINALYSIS AUTO W/SCOPE: CPT

## 2022-08-30 PROCEDURE — 80053 COMPREHEN METABOLIC PANEL: CPT

## 2022-08-30 PROCEDURE — 84703 CHORIONIC GONADOTROPIN ASSAY: CPT

## 2022-08-30 RX ORDER — NALTREXONE HYDROCHLORIDE 50 MG/1
50 TABLET, FILM COATED ORAL DAILY
Status: SHIPPED | COMMUNITY
End: 2022-09-27 | Stop reason: SDUPTHER

## 2022-08-30 ASSESSMENT — FIBROSIS 4 INDEX: FIB4 SCORE: 0.56

## 2022-08-31 ENCOUNTER — APPOINTMENT (OUTPATIENT)
Dept: MEDICAL GROUP | Facility: PHYSICIAN GROUP | Age: 42
End: 2022-08-31
Payer: COMMERCIAL

## 2022-08-31 VITALS
SYSTOLIC BLOOD PRESSURE: 110 MMHG | HEIGHT: 72 IN | BODY MASS INDEX: 26.99 KG/M2 | WEIGHT: 199.3 LBS | DIASTOLIC BLOOD PRESSURE: 72 MMHG | TEMPERATURE: 99 F | RESPIRATION RATE: 18 BRPM | OXYGEN SATURATION: 96 % | HEART RATE: 65 BPM

## 2022-08-31 NOTE — ED TRIAGE NOTES
Caroline Yissel Grewal  41 y.o. female  Chief Complaint   Patient presents with    Vaginal Bleeding     Period started Sunday, increased bleeding tonight from [id number]. Changing super/super plus tampons q10-15 mins. + large clots.      Pt ambulatory with steady gait to triage for above complaint.     Pt is alert, oriented, and follows commands. Pt speaking in full sentences and responds appropriately to questions. No acute distress noted in triage and respirations are even and unlabored.     Pt placed in lobby and educated on triage process. Pt encouraged to alert staff for any changes in condition.

## 2022-08-31 NOTE — ED PROVIDER NOTES
ED Provider Note    Scribed for Harpreet Chavez M.D. by Finesse Falk. 2022  9:49 PM    Primary care provider: CAMILA Portillo  Means of arrival: Walk-in  History obtained from: patient  History limited by: none    CHIEF COMPLAINT  Chief Complaint   Patient presents with    Vaginal Bleeding     Period started , increased bleeding tonight from [id number]. Changing super/super plus tampons q10-15 mins. + large clots.        HPI  Caroline Grewal is a 41 y.o. female who presents to the Emergency Department for evaluation of vaginal bleeding onset 4:00 PM today. She states that she began her period on , with a normal amount of bleeding; however, her bleeding got worse today prompting her to come to the ED. She reports needing to change her tampons every 15 minutes. The patient states she also has blood clots, but denies any fevers. She has never had this problem before. She reports use of birth control, however, she ceased use 1.5 years ago and her menstrual cycle has been progressively worse over the last 5 months. She previously used birth control for approximately 6 years. She has seen a PA (DAVID) at Dr. Mendoza's office regarding these issues who recommended an IUD. She denies any history of blood transfusions or bleeding this heavy in the past. She has a history of D&C and . She has an appointment in September scheduled for a transvaginal ultrasound and biopsy. She has a follow up planned for . Her mother has a history of endometriosis.    REVIEW OF SYSTEMS  Pertinent positives include abdominal vaginal bleeding and clots. Pertinent negatives include no fevers.  All other systems reviewed and negative.    PAST MEDICAL HISTORY   has a past medical history of Abnormal blood chemistry (2020), Anxiety, Cough (2020), Depression, GERD (gastroesophageal reflux disease), and SOB (shortness of breath) (2021).    SURGICAL HISTORY   has a past surgical history  "that includes abdominal exploration.    SOCIAL HISTORY  Social History     Tobacco Use    Smoking status: Former     Types: Cigarettes     Quit date:      Years since quittin.6    Smokeless tobacco: Never   Vaping Use    Vaping Use: Never used   Substance Use Topics    Alcohol use: Yes     Comment: Occasionally     Drug use: No      Social History     Substance and Sexual Activity   Drug Use No       FAMILY HISTORY  Family History   Problem Relation Age of Onset    Diabetes Mother     Heart Disease Father         MI    No Known Problems Sister        CURRENT MEDICATIONS  Home Medications       Reviewed by Lexii Estes R.N. (Registered Nurse) on 22 at 2119  Med List Status: Complete     Medication Last Dose Status   albuterol 108 (90 Base) MCG/ACT Aero Soln inhalation aerosol  Active   buPROPion (WELLBUTRIN XL) 300 MG XL tablet  Active   naltrexone (DEPADE) 50 MG Tab  Active   sertraline (ZOLOFT) 100 MG Tab  Active   VYVANSE 20 MG Cap  Active                    ALLERGIES  Allergies   Allergen Reactions    Dairy Food Allergy Rash     Pt states hands and legs       PHYSICAL EXAM  VITAL SIGNS: /75   Pulse 62   Temp 36.6 °C (97.8 °F) (Temporal)   Resp 16   Ht 1.854 m (6' 1\")   Wt 90.4 kg (199 lb 4.7 oz)   SpO2 98%   BMI 26.29 kg/m²     Pulse oximeter interpretation: Normal  Constitutional: Well developed, Well nourished, No acute distress, Non-toxic appearance.   HENT: Normocephalic, Atraumatic  Eyes: Conjunctiva normal, No discharge.   Neck: Normal range of motion, Supple  Cardiovascular: Normal heart rate, Normal rhythm, No murmurs, No rubs, No gallops.   Thorax & Lungs: Normal breath sounds, No respiratory distress  Abdomen: Bowel sounds normal, Soft, No tenderness, No masses, No pulsatile masses.   Pelvic Exam:   Pelvic exam was performed with female chaperone. Normal external genitalia.  Normal vagina with no polyps or lesions. Scant dark red vaginal blood, no active bleeding or " clots  Skin: Warm, Dry, No erythema, No rash.   Back: No tenderness, No CVA tenderness.   Extremities: No edema, No cyanosis  Neurologic: Alert & oriented,  No focal deficits noted.     LABS  Labs Reviewed   CBC WITH DIFFERENTIAL - Abnormal; Notable for the following components:       Result Value    RBC 3.89 (*)     Hemoglobin 10.5 (*)     Hematocrit 33.2 (*)     MCHC 31.6 (*)     RDW 54.4 (*)     Monos (Absolute) 0.89 (*)     Baso (Absolute) 0.13 (*)     All other components within normal limits   URINALYSIS,CULTURE IF INDICATED - Abnormal; Notable for the following components:    Occult Blood Moderate (*)     All other components within normal limits    Narrative:     Indication for culture:->Patient WITHOUT an indwelling Alvarado  catheter in place with new onset of Dysuria, Frequency,  Urgency, and/or Suprapubic pain   URINE MICROSCOPIC (W/UA) - Abnormal; Notable for the following components:    RBC 20-50 (*)     All other components within normal limits    Narrative:     Indication for culture:->Patient WITHOUT an indwelling Alvarado  catheter in place with new onset of Dysuria, Frequency,  Urgency, and/or Suprapubic pain   COMP METABOLIC PANEL   LIPASE   HCG QUAL SERUM   ESTIMATED GFR     All labs reviewed by me.    RADIOLOGY  US-PELVIC COMPLETE (TRANSABDOMINAL/TRANSVAGINAL) (COMBO)   Final Result         1.  Endometrial stripe thickening, likely represent proliferative changes given patient age, consider endometrial pathology with additional workup as clinically appropriate.   2.  Uterine fibroid changes.        The radiologist's interpretation of all radiological studies have been reviewed by me.    COURSE & MEDICAL DECISION MAKING  Pertinent Labs & Imaging studies reviewed. (See chart for details)    9:49 PM - Patient seen and examined at bedside. Ordered US-Pelvic complete, Urine microscopic, eGFR,CBC w/ diff, CMP, lipase, HCG qual serum, and UA w/ culture if indicated to evaluate her symptoms. Informed the  patient that her hemoglobin level today was 10.5, which is well above the level 7 required to initiate transfusion. Additionally informed her that her hemoglobin has not changed significantly from 1 month ago, which was a 10.7.     10:11 PM - Pelvic exam performed at this time with a female chaperone. Patient verbalizes understanding and agreement to this plan of care.      I reevaluated the patient at bedside. The patient informs me they feel improved.  Vaginal bleeding has ceased. I discussed the patient's diagnostic study results which show fibroid uterus and thickened endometrial stripe. I discussed plan for discharge and follow up as outlined below. The patient is stable for discharge at this time and will return for any new or worsening symptoms. Patient verbalizes understanding and support with my plan for discharge.     Decision Making:  This is a 41 y.o. year old female who presents with extremely heavy vaginal bleeding earlier today.  Hemodynamically stable upon arrival.  No obvious pallor or symptoms of anemia.  Has never required blood transfusion in the past.    Laboratory studies were performed and the patient is slightly anemic with a hemoglobin of 10.5 however this is the patient's baseline as compared to a few months ago.  No coagulopathy history.  She has had regular heavy menstrual cycles though this does appear to be heavier than usual.    Pelvic exam was performed however and the patient does not have active brisk hemorrhage.  No evidence of clots.    Pelvic ultrasound was performed showing heterogeneous uterus consistent with fibroid uterus and thickened endometrial stripe.  She does have outpatient follow-up planned with OB/GYN for endometrial biopsy.  Recommending prompt outpatient follow-up.    The patient did not have a significant drop in her hemoglobin.  She is hemodynamically stable.  Bleeding seems to have improved.  No pathology identified on ultrasound requiring immediate  intervention.  Patient is not pregnant.  Recommending further care from OB/GYN to help manage heavy menstrual cycles.    The patient will return for new or worsening symptoms and is stable at the time of discharge. Patient was given return precautions. Patient and/or family member verbalizes understanding and will comply.    DISPOSITION:  Patient will be discharged home in stable condition.    FOLLOW UP:  Brit Moreno A.P.R.N.  [address]  [place] [id number] [phone]    Schedule an appointment as soon as possible for a visit       [facility], Emergency Dept  [address] [facility] [id number] [phone]    As needed, If symptoms worsen      OUTPATIENT MEDICATIONS:  Discharge Medication List as of 8/31/2022 12:09 AM            FINAL IMPRESSION  1. Vaginal bleeding         This dictation has been created using voice recognition software and/or scribes. The accuracy of the dictation is limited by the abilities of the software and the expertise of the scribes. I expect there may be some errors of grammar and possibly content. I made every attempt to manually correct the errors within my dictation. However, errors related to voice recognition software and/or scribes may still exist and should be interpreted within the appropriate context.     IFinesse (Scribe), am scribing for, and in the presence of, Harpreet Chavez M.D..    Electronically signed by: Finesse Falk (Charmaine), 8/30/2022    Harpreet GUTIERREZ M.D. personally performed the services described in this documentation, as scribed by Finesse Falk in my presence, and it is both accurate and complete.      The note accurately reflects work and decisions made by me.  Harpreet Chavez M.D.  8/31/2022  12:50 AM

## 2022-09-27 ENCOUNTER — TELEMEDICINE (OUTPATIENT)
Dept: BEHAVIORAL HEALTH | Facility: CLINIC | Age: 42
End: 2022-09-27
Payer: COMMERCIAL

## 2022-09-27 DIAGNOSIS — F50.2 BULIMIA NERVOSA, PURGING TYPE: ICD-10-CM

## 2022-09-27 DIAGNOSIS — F41.1 GENERALIZED ANXIETY DISORDER: ICD-10-CM

## 2022-09-27 DIAGNOSIS — F33.1 MODERATE RECURRENT MAJOR DEPRESSION (HCC): ICD-10-CM

## 2022-09-27 DIAGNOSIS — F50.2 BULIMIA NERVOSA: ICD-10-CM

## 2022-09-27 PROCEDURE — 99213 OFFICE O/P EST LOW 20 MIN: CPT | Mod: 95 | Performed by: PSYCHIATRY & NEUROLOGY

## 2022-09-27 PROCEDURE — 90833 PSYTX W PT W E/M 30 MIN: CPT | Mod: 95 | Performed by: PSYCHIATRY & NEUROLOGY

## 2022-09-27 RX ORDER — LISDEXAMFETAMINE DIMESYLATE CAPSULES 40 MG/1
40 CAPSULE ORAL DAILY
Qty: 30 CAPSULE | Refills: 0 | Status: SHIPPED | OUTPATIENT
Start: 2022-09-27 | End: 2022-09-27

## 2022-09-27 RX ORDER — BUPROPION HYDROCHLORIDE 300 MG/1
300 TABLET ORAL
Qty: 90 TABLET | Refills: 2 | Status: SHIPPED | OUTPATIENT
Start: 2022-09-27 | End: 2022-11-04 | Stop reason: SDUPTHER

## 2022-09-27 RX ORDER — LISDEXAMFETAMINE DIMESYLATE 40 MG/1
40 CAPSULE ORAL DAILY
Qty: 30 CAPSULE | Refills: 0 | Status: SHIPPED | OUTPATIENT
Start: 2022-09-27 | End: 2022-10-27

## 2022-09-27 RX ORDER — NALTREXONE HYDROCHLORIDE 50 MG/1
50 TABLET, FILM COATED ORAL DAILY
Qty: 90 TABLET | Refills: 0 | Status: SHIPPED | OUTPATIENT
Start: 2022-09-27 | End: 2022-11-04 | Stop reason: SDUPTHER

## 2022-09-27 RX ORDER — SERTRALINE HYDROCHLORIDE 100 MG/1
200 TABLET, FILM COATED ORAL
Qty: 180 TABLET | Refills: 0 | Status: SHIPPED | OUTPATIENT
Start: 2022-09-27 | End: 2022-11-04 | Stop reason: SDUPTHER

## 2022-09-27 NOTE — PROGRESS NOTES
[facility] FOLLOW-UP NOTE    This provider informed the patient their medical records are totally confidential except for the use by other providers involved in their care, or if the patient signs a release, or to report instances of child or elder abuse, or if it is determined they are an immediate risk to harm themselves or others.  In order to avoid spread of covid-19 virus this appointment was conducted by telehealth.  The patient gave consent to have this follow up session by video telehealth.  The visit was in the home setting.     This evaluation was conducted via Zoom using secure and encrypted videoconferencing technology.   The patient was in her home in the [facility].     The patient's identity was confirmed and verbal consent was obtained for this virtual visit.     Time at beginning of call:  8:00 AM    TOTAL FACE-TO-FACE TIME  30 minutes    CHIEF COMPLAINT       Having depression, generalized anxiety, panic attacks,  binging and purging.  HISTORY OF PRESENT ILLNESS       The patient is a  39yo W female and ED  and mother of 2 daughterswho is followed by this provider for recurrent Major Depression and Generalized Anxiety Disorder with Panic Attacks.  She returns to follow up after a 15 month absence due to insurance changes.  She has had depressive episodes since she was 13yo which were exacerbated when her divorce was finalized in 2015 and she got shared custody of her 2 daughters.  Her depressive episodes have an atypical pattern of anhedonia, hypersomnia, variable appetite, and moderate psychomotor retardation.  She denies ever having an overt suicidal plan, intent, or attempt.  She is struggling emotionally and feels sad or angry and is very critical about herself.       She has a dysfunctional relationship with her mother's family and only saw her father on weekends.  Her father didn't like fat people and the  "patient was chubby growing up.  Her father had a heart attack.       She has generalized anxiety disorder with panic attacks with restlessness, excessive worry and concern, irritability, muscle tension in her neck, shoulders, and upper back, and occasionally she gets so anxious that her mind goes blank.  She has panic attacks 3 to 4 times a month.  She went on a trip with her mother who triggers her a lot and she was not as anxious as she was in the past.       She is self-critical and wishes she could have more fun with her daughters.  She has better alleviation of depression and anxiety on sertraline 200mg po daily combined with bupropion XL 300mg po daily.  However, she has been struggling lately with some \"unhealthy behaviors\" (binging/purging, picking).  She will be started on a trial of naltrexone 50mg po daily to help curb these behaviors.  She has struggled with bulimic symptoms since she was 15yo.  The naltrexone has decreased her unwanted behaviors.  Her bulimic symptoms got really bad during COVID but it began to irritate her esophagus.       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  She goes to orientation next week.       Her  has been able to move to [place] and she sees her 16yo daughter more often.  She butts heads with her daughter because they are very much alike.  She is starting to exercise and it helps her manage to keep herself together.        She had corona virus infection around Thanksgiving and still has some brain fog but can get on her spin bike and does Kundalini yoga.  At times things move too fast in the ED.  She has stopped drinking and feels less out of control.  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  RESPONSE TO TREATMENT       Having fewer panic attacks  PAST PSYCHIATRIC " MEDICATIONS       Citalopram, bupropion XL, sertraline, naltrexone.  MEDICATION SIDE EFFECTS       None    MEDICAL REVIEW OF SYSTEMS:   Constitutional negative   Eyes negative   Ears/Nose/Mouth/Throat negative   Cardiovascular negative   Respiratory positive - breast lump   Gastrointestinal positive - GERD   Genitourinary positive - vaginal bleeding, she has fibroids.   Muscular negative   Integumentary positive - (R) shoulder pain   Neurological negative   Endocrine positive - hyperlipidemia   Hematologic/Lymphatic positive - varicose veins (R) lower extremity        MENTAL STATUS EVALUATION  There were no vitals taken for this visit.  Participation: Active verbal participation  Grooming:Neat  Orientation: Fully Oriented  Eye contact: Good  Behavior:Agitated   Mood: Euthymic  Affect: Full range  Thought process: Logical  Thought content:  Within normal limits  Speech: Rate within normal limits and Volume within normal limits  Perception:  Within normal limits  Memory:  No gross evidence of memory deficits  Insight: Good  Judgment: Good  Family/couple interaction observations:   Other:  STEVE-7 Questionnaire    Feeling nervous, anxious, or on edge:  2  Not being able to stop or control worryin  Worrying too much about different things:  1  Trouble relaxin  Being so restless that it's hard to sit still:  2  Becoming easily annoyed or irritable:  2  Feeling afraid as if something awful might happen:  1  Total:  10    Interpretation of STEVE 7 Total Score   Score Severity :  0-4 No Anxiety   5-9 Mild Anxiety  10-14 Moderate Anxiety  15-21 Severe Anxiety   Depression Screening    Little interest or pleasure in doing things?    1  Feeling down, depressed , or hopeless?   1  Trouble falling or staying asleep, or sleeping too much?    1  Feeling tired or having little energy?    1  Poor appetite or overeating?    1  Feeling bad about yourself - or that you are a failure or have let yourself or your family down?    "1  Trouble concentrating on things, such as reading the newspaper or watching television?   1  Moving or speaking so slowly that other people could have noticed.  Or the opposite - being so fidgety or restless that you have been moving around a lot more than usual?    2  Thoughts that you would be better off dead, or of hurting yourself?    1  Patient Health Questionnaire Score:   10      If depressive symptoms identified deferred to follow up visit unless specifically addressed in assesment and plan.    Interpretation of PHQ-9 Total Score   Score Severity   1-4 No Depression   5-9 Mild Depression   10-14 Moderate Depression   15-19 Moderately Severe Depression   20-27 Severe Depression   Current risk:    Suicide: Low   Homicide: Not applicable   Self-harm: Low  Relapse: Moderate  Other:   Crisis Safety Plan reviewed?No  If evidence of imminent risk is present, intervention/plan:    Medical Records/Labs/Diagnostic Tests Reviewed: yes    Medical Records/Labs/Diagnostic Tests Ordered: no    DIAGNOSTIC IMPRESSIONS       (1) Major Depression, Recurrent, Moderate (F33.1)       (2) Generalized Anxiety Disorder with Panic Attacks (F41.1)       (3) Bulimia Nervosa (F50.2)    ASSESSMENT AND PLAN       Her binging and purging are compulsive behaviors under increased stress and low self esteem.  Her sertraline is 200mg po daily which should act against compulsive behaviors and she was started on naltrexone 50mg po daily to reduce craving and picking behavior.       Continue naltrexone 50mg po daily.       Continue sertraline 200mg po daily.       Continue bupropion XL 300mg po daily.       Continue vyvanse 40mg po daily.       RTC to  Clinic in 3 months for 30 min follow up with this provider.       Time at end of call:  8:30 AM    Greater than 16 minutes spent in psychotherapy exclusive of my E&M.  Topics addressed in psychotherapy include:  When she engages in \"unhealthy behaviors\" she feels guilty or has low " self-worth.  She still has some bulimic behaviors.  She has continued nursing school education.

## 2022-10-06 ENCOUNTER — IMMUNIZATION (OUTPATIENT)
Dept: OCCUPATIONAL MEDICINE | Facility: CLINIC | Age: 42
End: 2022-10-06

## 2022-10-06 DIAGNOSIS — Z23 NEED FOR VACCINATION: Primary | ICD-10-CM

## 2022-10-06 PROCEDURE — 90686 IIV4 VACC NO PRSV 0.5 ML IM: CPT | Performed by: NURSE PRACTITIONER

## 2022-11-04 ENCOUNTER — TELEMEDICINE (OUTPATIENT)
Dept: BEHAVIORAL HEALTH | Facility: CLINIC | Age: 42
End: 2022-11-04
Payer: COMMERCIAL

## 2022-11-04 DIAGNOSIS — F41.1 GENERALIZED ANXIETY DISORDER: ICD-10-CM

## 2022-11-04 DIAGNOSIS — F50.2 BULIMIA NERVOSA, PURGING TYPE: ICD-10-CM

## 2022-11-04 DIAGNOSIS — F33.1 MODERATE RECURRENT MAJOR DEPRESSION (HCC): ICD-10-CM

## 2022-11-04 DIAGNOSIS — F90.0 ADHD (ATTENTION DEFICIT HYPERACTIVITY DISORDER), INATTENTIVE TYPE: ICD-10-CM

## 2022-11-04 PROCEDURE — 99213 OFFICE O/P EST LOW 20 MIN: CPT | Mod: 95 | Performed by: PSYCHIATRY & NEUROLOGY

## 2022-11-04 PROCEDURE — 90833 PSYTX W PT W E/M 30 MIN: CPT | Mod: 95 | Performed by: PSYCHIATRY & NEUROLOGY

## 2022-11-04 RX ORDER — LISDEXAMFETAMINE DIMESYLATE 50 MG
50 CAPSULE ORAL EVERY MORNING
Qty: 30 CAPSULE | Refills: 0 | Status: SHIPPED | OUTPATIENT
Start: 2022-11-04 | End: 2022-12-04

## 2022-11-04 RX ORDER — NALTREXONE HYDROCHLORIDE 50 MG/1
50 TABLET, FILM COATED ORAL DAILY
Qty: 90 TABLET | Refills: 0 | Status: SHIPPED | OUTPATIENT
Start: 2022-11-04 | End: 2023-02-02

## 2022-11-04 RX ORDER — SERTRALINE HYDROCHLORIDE 100 MG/1
200 TABLET, FILM COATED ORAL
Qty: 180 TABLET | Refills: 0 | Status: SHIPPED | OUTPATIENT
Start: 2022-11-04 | End: 2023-02-02

## 2022-11-04 RX ORDER — BUPROPION HYDROCHLORIDE 300 MG/1
300 TABLET ORAL
Qty: 90 TABLET | Refills: 2 | Status: SHIPPED | OUTPATIENT
Start: 2022-11-04 | End: 2023-08-23

## 2022-11-04 NOTE — PROGRESS NOTES
[facility] PSYCHIATRIC FOLLOW-UP NOTE    This provider informed the patient their medical records are totally confidential except for the use by other providers involved in their care, or if the patient signs a release, or to report instances of child or elder abuse, or if it is determined they are an immediate risk to harm themselves or others.    This evaluation was conducted via Zoom using secure and encrypted videoconferencing technology.   The patient was in their home in the [facility].     The patient's identity was confirmed and verbal consent was obtained for this virtual visit.      Time at beginning of call:  1:00 PM    TOTAL FACE-TO-FACE TIME  30 minutes    CHIEF COMPLAINT       Having depression, generalized anxiety, panic attacks, binging and purging.  HISTORY OF PRESENT ILLNESS       The patient is a  39yo W female and ED  and mother of 2 daughters who is followed by this provider for recurrent Major Depression and Generalized Anxiety Disorder with Panic Attacks.  She returns to follow up after a 15 month absence due to insurance changes.  She has had depressive episodes since she was 13yo which were exacerbated when her divorce was finalized in 2015 and she got shared custody of her 2 daughters.  Her depressive episodes have an atypical pattern of anhedonia, hypersomnia, variable appetite, and moderate psychomotor retardation.  She denies ever having an overt suicidal plan, intent, or attempt.  She is struggling emotionally and feels sad or angry and is very critical about herself.       She has a dysfunctional relationship with her mother's family and only saw her father on weekends.  Her father didn't like fat people and the patient was chubby growing up.  Her father had a heart attack.       She has generalized anxiety disorder with panic attacks with restlessness, excessive worry and concern, irritability, muscle tension in  "her neck, shoulders, and upper back, and occasionally she gets so anxious that her mind goes blank.  She has panic attacks 3 to 4 times a month.  She went on a trip with her mother who triggers her a lot and she was not as anxious as she was in the past.       She is self-critical and wishes she could have more fun with her daughters.  She has better alleviation of depression and anxiety on sertraline 200mg po daily combined with bupropion XL 300mg po daily.  However, she has been struggling lately with some \"unhealthy behaviors\" (binging/purging, picking).  She will be started on a trial of naltrexone 50mg po daily to help curb these behaviors.  She has struggled with bulimic symptoms since she was 17yo.  The naltrexone has decreased her unwanted behaviors.  Her bulimic symptoms got really bad during COVID but it began to irritate her esophagus.       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  She goes to orientation next week.  She is not the oldest woman in her class.       Her  has been able to move to [facility] and she sees her 16yo daughter more often.  She butts heads with her daughter because they are very much alike.  She is starting to exercise and it helps her manage to keep herself together.        She had corona virus infection around Thanksgiving and still has some brain fog but can get on her spin bike and does Kundalini yoga.  At times things move too fast in the ED.  She has stopped drinking and feels less out of control.  PSYCHOSOCIAL CHANGES SINCE PREVIOUS CONTACT       She has been remarried for 3 years and says its a better relationship.  Her second  is her best friend.  RESPONSE TO TREATMENT       In the past month her picking of her skin once again.  PAST PSYCHIATRIC MEDICATIONS       Citalopram, bupropion XL, sertraline, naltrexone.  MEDICATION SIDE EFFECTS       None    MEDICAL " REVIEW OF SYSTEMS:   Constitutional negative   Eyes negative   Ears/Nose/Mouth/Throat negative   Cardiovascular negative   Respiratory positive - breast lump   Gastrointestinal positive - GERD   Genitourinary positive - vaginal bleeding, she has fibroids.   Muscular negative   Integumentary positive - (R) shoulder pain   Neurological negative   Endocrine positive - hyperlipidemia   Hematologic/Lymphatic positive - varicose veins (R) lower extremity        MENTAL STATUS EVALUATION  There were no vitals taken for this visit.  Participation: Active verbal participation  Grooming:Neat  Orientation: Fully Oriented  Eye contact: Good  Behavior:Calm   Mood: Euthymic  Affect: Full range  Thought process: Logical  Thought content:  Within normal limits  Speech: Rate within normal limits and Volume within normal limits  Perception:  Within normal limits  Memory:  No gross evidence of memory deficits  Insight: Good  Judgment: Good  Family/couple interaction observations:   Other:  STEVE-7 Questionnaire    Feeling nervous, anxious, or on edge:  2  Not being able to stop or control worryin  Worrying too much about different things:  1  Trouble relaxin  Being so restless that it's hard to sit still:  1  Becoming easily annoyed or irritable:  1  Feeling afraid as if something awful might happen:  1  Total:  8    Interpretation of STEVE 7 Total Score   Score Severity :  0-4 No Anxiety   5-9 Mild Anxiety  10-14 Moderate Anxiety  15-21 Severe Anxiety   Depression Screening    Little interest or pleasure in doing things?    2  Feeling down, depressed , or hopeless?   1  Trouble falling or staying asleep, or sleeping too much?    0  Feeling tired or having little energy?    2  Poor appetite or overeating?    2  Feeling bad about yourself - or that you are a failure or have let yourself or your family down?   1  Trouble concentrating on things, such as reading the newspaper or watching television?   1  Moving or speaking so slowly  that other people could have noticed.  Or the opposite - being so fidgety or restless that you have been moving around a lot more than usual?    0  Thoughts that you would be better off dead, or of hurting yourself?    1  Patient Health Questionnaire Score:   10      If depressive symptoms identified deferred to follow up visit unless specifically addressed in assesment and plan.    Interpretation of PHQ-9 Total Score   Score Severity   1-4 No Depression   5-9 Mild Depression   10-14 Moderate Depression   15-19 Moderately Severe Depression   20-27 Severe Depression   Current risk:    Suicide: Low   Homicide: Not applicable   Self-harm: Low  Relapse: Moderate  Other:   Crisis Safety Plan reviewed?No  If evidence of imminent risk is present, intervention/plan:    Medical Records/Labs/Diagnostic Tests Reviewed: yes    Medical Records/Labs/Diagnostic Tests Ordered: no    DIAGNOSTIC IMPRESSIONS       (1) Major Depression, Recurrent, Moderate (F33.1)       (2) Generalized Anxiety Disorder with Panic Attacks (F41.1)       (3) Bulimia Nervosa (F50.2)    ASSESSMENT AND PLAN       Her binging and purging are compulsive behaviors under increased stress and low self esteem.  Her sertraline is 200mg po daily which should act against compulsive behaviors and she was started on naltrexone 50mg po daily to reduce craving and picking behavior.       Continue naltrexone 50mg po daily.       Continue sertraline 200mg po daily.       Continue bupropion XL 300mg po daily.       Continue vyvanse 40mg po daily.       RTC to  Clinic in 2 months for 30 min follow up with this provider.    Time at end of call:  1:30 PM    Greater than 16 minutes spent in psychotherapy exclusive of my E&M.  Topics addressed in psychotherapy include:  She is overcoming some of the cognitive distortions which her father taught to her.  She sees bulimia as a depressive equivalent and expression of self-loathing.  She needs to see her objective self as cabrera  acceptable and beautiful.

## 2022-12-27 ENCOUNTER — HOSPITAL ENCOUNTER (EMERGENCY)
Facility: MEDICAL CENTER | Age: 42
End: 2022-12-27
Attending: EMERGENCY MEDICINE
Payer: COMMERCIAL

## 2022-12-27 VITALS
OXYGEN SATURATION: 98 % | BODY MASS INDEX: 26.73 KG/M2 | HEART RATE: 66 BPM | TEMPERATURE: 97.2 F | SYSTOLIC BLOOD PRESSURE: 118 MMHG | RESPIRATION RATE: 16 BRPM | DIASTOLIC BLOOD PRESSURE: 72 MMHG | HEIGHT: 72 IN | WEIGHT: 197.31 LBS

## 2022-12-27 DIAGNOSIS — M62.830 MUSCLE SPASM OF BACK: ICD-10-CM

## 2022-12-27 PROCEDURE — A9270 NON-COVERED ITEM OR SERVICE: HCPCS | Performed by: EMERGENCY MEDICINE

## 2022-12-27 PROCEDURE — 99283 EMERGENCY DEPT VISIT LOW MDM: CPT

## 2022-12-27 PROCEDURE — 700102 HCHG RX REV CODE 250 W/ 637 OVERRIDE(OP): Performed by: EMERGENCY MEDICINE

## 2022-12-27 RX ORDER — LIDOCAINE 50 MG/G
1 PATCH TOPICAL EVERY 24 HOURS
Qty: 5 PATCH | Refills: 0 | Status: SHIPPED | OUTPATIENT
Start: 2022-12-27 | End: 2023-01-01

## 2022-12-27 RX ORDER — IBUPROFEN 800 MG/1
800 TABLET ORAL EVERY 8 HOURS PRN
Qty: 20 TABLET | Refills: 0 | Status: SHIPPED | OUTPATIENT
Start: 2022-12-27 | End: 2022-12-30

## 2022-12-27 RX ORDER — DIAZEPAM 5 MG/1
5 TABLET ORAL
Qty: 3 TABLET | Refills: 0 | Status: SHIPPED | OUTPATIENT
Start: 2022-12-27 | End: 2022-12-30

## 2022-12-27 RX ORDER — CYCLOBENZAPRINE HCL 10 MG
10 TABLET ORAL ONCE
Status: COMPLETED | OUTPATIENT
Start: 2022-12-27 | End: 2022-12-27

## 2022-12-27 RX ORDER — DIAZEPAM 5 MG/1
5 TABLET ORAL
Qty: 3 TABLET | Refills: 0 | Status: SHIPPED | OUTPATIENT
Start: 2022-12-27 | End: 2022-12-27 | Stop reason: SDUPTHER

## 2022-12-27 RX ORDER — ACETAMINOPHEN 500 MG
1000 TABLET ORAL EVERY 6 HOURS PRN
Qty: 20 TABLET | Refills: 0 | Status: SHIPPED | OUTPATIENT
Start: 2022-12-27 | End: 2022-12-31

## 2022-12-27 RX ORDER — CYCLOBENZAPRINE HCL 10 MG
10 TABLET ORAL 3 TIMES DAILY PRN
Qty: 10 TABLET | Refills: 0 | Status: SHIPPED | OUTPATIENT
Start: 2022-12-27 | End: 2022-12-30

## 2022-12-27 RX ADMIN — IBUPROFEN 800 MG: 200 TABLET, FILM COATED ORAL at 02:31

## 2022-12-27 RX ADMIN — CYCLOBENZAPRINE 10 MG: 10 TABLET, FILM COATED ORAL at 02:31

## 2022-12-27 ASSESSMENT — FIBROSIS 4 INDEX: FIB4 SCORE: 0.73

## 2022-12-27 NOTE — ED PROVIDER NOTES
ER Provider Note    Scribed for Bharat Escamilla by Tg Molina. 12/27/2022  2:09 AM    Primary Care Provider: CAMILA Portillo  Means of Arrival: Walk-in  History obtained from: Patient    CHIEF COMPLAINT  Chief Complaint   Patient presents with    Shoulder Pain     2x weeks ago pt slept on her side wrong and since then has pain to L shoulder, pt states it has worsened over the day since working. Pt states pain radiates down her L arm and is causing some paresthesia. Pt denies SOB. Pt has attempted Ice and heat, NSAID's, and lidocaine patch without relief.      LIMITATION TO HISTORY   Select: None    HPI  Caroline Grewal is a 42 y.o. female who presents to the ED for worsening left shoulder pain onset 2 weeks ago. The patient reports she initially thought she slept on her neck wrong, however the pain has increased. She states tonight she took Advil, Tylenol, and applied a lidocaine patch with mild alleviation. The patient reports after a few hours she awoke feeling nauseous, dizzy and sweaty. She states she has been able to do some mild stretching with mild alleviation, but the pain has still worsened. She describes her pain radiates down her left arm. She denies symptoms of cervical, thoracic, or lumbar back pain. The patient notes she was sick 2 weeks ago. She denies recent trauma or car accidents.    OUTSIDE HISTORIAN(S):  Select: None    EXTERNAL RECORDS REVIEWED  Select: Outpatient Notes reviewed     REVIEW OF SYSTEMS  Pertinent positives include left shoulder pain. Pertinent negatives include no cervical, thoracic, or lumbar back pain.      PAST MEDICAL HISTORY  Past Medical History:   Diagnosis Date    Abnormal blood chemistry 6/4/2020    Anxiety     Cough 8/20/2020    Depression     GERD (gastroesophageal reflux disease)     SOB (shortness of breath) 4/26/2021     SURGICAL HISTORY  Past Surgical History:   Procedure Laterality Date    ABDOMINAL EXPLORATION      Abdominoplasty  "    FAMILY HISTORY  Family History   Problem Relation Age of Onset    Diabetes Mother     Heart Disease Father         MI    No Known Problems Sister      SOCIAL HISTORY   reports that she quit smoking about 4 years ago. Her smoking use included cigarettes. She has never used smokeless tobacco. She reports current alcohol use. She reports that she does not use drugs.    CURRENT MEDICATIONS  Previous Medications    ALBUTEROL 108 (90 BASE) MCG/ACT AERO SOLN INHALATION AEROSOL    INHALE 2 PUFFS BY MOUTH EVERY FOUR HOURS AS NEEDED FOR SHORTNESS OF BREATH.    BUPROPION (WELLBUTRIN XL) 300 MG XL TABLET    Take 1 Tablet by mouth every day.    NALTREXONE (DEPADE) 50 MG TAB    Take 1 Tablet by mouth every day for 90 days.    SERTRALINE (ZOLOFT) 100 MG TAB    Take 2 Tablets by mouth every day for 90 days.     ALLERGIES  Dairy food allergy    PHYSICAL EXAM  Vitals:    12/27/22 0121 12/27/22 0132 12/27/22 0133 12/27/22 0200   BP: 112/75   118/72   Pulse: 62   66   Resp: 14   16   Temp: 36.2 °C (97.2 °F)   36.2 °C (97.2 °F)   TempSrc: Temporal      SpO2: 97%   98%   Weight:  89.5 kg (197 lb 5 oz)     Height:   1.854 m (6' 1\")      Vitals: My interpretation: normotensive, not tachycardic, afebrile, not hypoxic    Reinterpretation of vitals: Unremarkable    Gen: sitting comfortably, speaking clearly, appears in no acute distress   ENT: Mucous membranes moist, posterior pharynx clear, uvula midline, nares patent bilaterally   Neck: Supple, FROM  Pulmonary: Lungs are clear to auscultation bilaterally. No tachypnea  CV: RRR, no murmur appreciated, pulses 2+ in both upper and lower extremities  Abdomen: soft, NT/ND; no rebound/guarding  : no CVA or suprapubic tenderness   Extremities: Tenderness throughout the left shoulder girdle muscle and trapezius that is reproducible with palpation and reproduces the patient's symptoms when massaged. Full range of motion intact. Neurovascularly intact, motor  intact and pulses intact. "   Neuro: A&Ox4 (person, place, time, situation), speech fluent, gait steady, no focal deficits appreciated  Skin: No rash or lesions.  No pallor or jaundice.  No cyanosis.  Warm and dry.     COURSE & MEDICAL DECISION MAKING    Nursing notes, vital signs, [id number] reviewed in chart.    Differential diagnoses include but are not limited to: Strain, sprain, fracture, dislocation, muscle spasm    PLAN AND DISPOSITION   2:09 AM - Patient seen and evaluated at bedside.     MDM: Healthy 42-year-old female presenting with 2 weeks of left shoulder girdle muscle spasm and pain causing paresthesias especially at night when trying to sleep.  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  She denies any trauma to the left shoulder or neck.  Symptoms worsen this evening.  She does take low-dose Tylenol and Motrin at home without any significant provement and tried a lidocaine patch without significant help to this evening.  Upon arrival she denies history of fever and her vital signs are unremarkable x2.  Physical exam shows full passive and active range of motion of the left arm and shoulder, neurovascular intact with strong distal pulses,  strength 5 out of 5, sensation normal.  When palpating trapezius and scapula muscles, patient has exquisite tenderness and states that this reproduces her symptoms.  I think likely she has a strain/muscle spasm of the muscles of the shoulder.  Discussed in detail massage therapy, foam rolling, physical therapy as needed and stretching exercises.  Will Rx for Tylenol, Motrin, Flexeril, lidocaine patches and nightly Valium for breakthrough pain.  Patient verbalized understand strict return precautions outpatient follow-up plan and is amenable.    FINAL IMPRESSION   1. Muscle spasm of back Acute     ITg (Scribe), am scribing for, and in the presence of,  Bharat Escamilla.    Electronically signed by: Tg Molina ([facility]), 12/27/2022    The note accurately reflects work and decisions made by me.  Bharat Escamilla  12/27/2022  2:25 AM

## 2022-12-27 NOTE — ED TRIAGE NOTES
Chief Complaint   Patient presents with    Shoulder Pain     2x weeks ago pt slept on her side wrong and since then has pain to L shoulder, pt states it has worsened over the day since working. Pt states pain radiates down her L arm and is causing some paresthesia. Pt denies SOB. Pt has attempted Ice and heat, NSAID's, and lidocaine patch without relief.      Pt ambulatory to triage for above complaint.      Pt is alert/oriented and follows commands. Pt speaking in full sentences and responds appropriately to questions. No acute distress noted in triage and respirations are even and unlabored.     Pt placed in lobby and educated on triage process. Pt encouraged to alert staff for any changes in condition.

## 2022-12-27 NOTE — DISCHARGE INSTRUCTIONS
Please take at 1000 mg of Tylenol 3 times a day. You may also take 800 mg of Motrin 3 times a day. I have prescribed you lidocaine patches and Flexeril 10 mg 3 times a day for breakthrough pain.  At night I have provided you a Valium pill for the next 3 nights.  Please be aware this will make you drowsy and sleepy, do not drink alcohol with it, only use it if it is Apsley necessary and you are unable to sleep due to your pain you have already taken Tylenol, Motrin and Flexeril.  I recommend using a foam roller which can be purchased at a sporting good store 3 times a day to help with pain. You may benefit from talking with your PCP about physical therapy, stretching exercises and other pain management strategies for your muscle spasm. Thank you for coming in today and please return if any symptoms worsen. Thank you.

## 2023-01-16 DIAGNOSIS — Z11.59 NEED FOR HEPATITIS C SCREENING TEST: ICD-10-CM

## 2023-01-16 DIAGNOSIS — Z11.1 SCREENING-PULMONARY TB: ICD-10-CM

## 2023-01-16 DIAGNOSIS — E78.5 DYSLIPIDEMIA: ICD-10-CM

## 2023-01-17 NOTE — PROGRESS NOTES
1. Need for hepatitis C screening test  - HEP C VIRUS ANTIBODY; Future    2. Dyslipidemia  - Lipid Profile; Future    3. Screening-pulmonary TB  - Quantiferon Gold TB (PPD); Future

## 2023-02-01 ENCOUNTER — HOSPITAL ENCOUNTER (OUTPATIENT)
Dept: LAB | Facility: MEDICAL CENTER | Age: 43
End: 2023-02-01
Attending: NURSE PRACTITIONER
Payer: COMMERCIAL

## 2023-02-01 DIAGNOSIS — E78.5 DYSLIPIDEMIA: ICD-10-CM

## 2023-02-01 DIAGNOSIS — Z11.59 NEED FOR HEPATITIS C SCREENING TEST: ICD-10-CM

## 2023-02-01 DIAGNOSIS — Z11.1 SCREENING-PULMONARY TB: ICD-10-CM

## 2023-02-01 LAB
CHOLEST SERPL-MCNC: 255 MG/DL (ref 100–199)
FASTING STATUS PATIENT QL REPORTED: NORMAL
HCV AB SER QL: NORMAL
HDLC SERPL-MCNC: 68 MG/DL
LDLC SERPL CALC-MCNC: 168 MG/DL
TRIGL SERPL-MCNC: 95 MG/DL (ref 0–149)

## 2023-02-01 PROCEDURE — 36415 COLL VENOUS BLD VENIPUNCTURE: CPT

## 2023-02-01 PROCEDURE — 86480 TB TEST CELL IMMUN MEASURE: CPT

## 2023-02-01 PROCEDURE — 80061 LIPID PANEL: CPT

## 2023-02-01 PROCEDURE — 86803 HEPATITIS C AB TEST: CPT

## 2023-02-03 LAB
GAMMA INTERFERON BACKGROUND BLD IA-ACNC: 0.11 IU/ML
M TB IFN-G BLD-IMP: NEGATIVE
M TB IFN-G CD4+ BCKGRND COR BLD-ACNC: 0.11 IU/ML
MITOGEN IGNF BCKGRD COR BLD-ACNC: >10 IU/ML
QFT TB2 - NIL TBQ2: -0.03 IU/ML

## 2023-02-07 ENCOUNTER — NON-PROVIDER VISIT (OUTPATIENT)
Dept: MEDICAL GROUP | Facility: PHYSICIAN GROUP | Age: 43
End: 2023-02-07
Payer: COMMERCIAL

## 2023-02-07 DIAGNOSIS — Z23 NEED FOR VACCINATION: ICD-10-CM

## 2023-02-07 PROCEDURE — 90746 HEPB VACCINE 3 DOSE ADULT IM: CPT | Performed by: NURSE PRACTITIONER

## 2023-02-07 PROCEDURE — 90471 IMMUNIZATION ADMIN: CPT | Performed by: NURSE PRACTITIONER

## 2023-02-07 NOTE — PROGRESS NOTES
"Caroline Grewal is a 42 y.o. female here for a non-provider visit for:   HEPATITIS B 3 of 3    Reason for immunization: continue or complete series started at the office  Immunization records indicate need for vaccine: No  Minimum interval has been met for this vaccine: No  ABN completed: Yes    VIS Dated  10/15/2021 was given to patient: Yes  All IAC Questionnaire questions were answered \"No.\"    Patient tolerated injection and no adverse effects were observed or reported: Yes    Pt scheduled for next dose in series: No    "

## 2023-11-13 ENCOUNTER — EH NON-PROVIDER (OUTPATIENT)
Dept: OCCUPATIONAL MEDICINE | Facility: CLINIC | Age: 43
End: 2023-11-13

## 2023-11-13 DIAGNOSIS — Z02.89 ENCOUNTER FOR OCCUPATIONAL HEALTH EXAMINATION INVOLVING RESPIRATOR: ICD-10-CM

## 2023-11-13 PROCEDURE — 94375 RESPIRATORY FLOW VOLUME LOOP: CPT | Performed by: PREVENTIVE MEDICINE

## 2023-11-14 ENCOUNTER — TELEMEDICINE (OUTPATIENT)
Dept: TELEHEALTH | Facility: TELEMEDICINE | Age: 43
End: 2023-11-14
Payer: COMMERCIAL

## 2023-11-14 DIAGNOSIS — J01.90 ACUTE BACTERIAL RHINOSINUSITIS: ICD-10-CM

## 2023-11-14 DIAGNOSIS — B96.89 ACUTE BACTERIAL RHINOSINUSITIS: ICD-10-CM

## 2023-11-14 PROCEDURE — 99213 OFFICE O/P EST LOW 20 MIN: CPT | Mod: 95 | Performed by: STUDENT IN AN ORGANIZED HEALTH CARE EDUCATION/TRAINING PROGRAM

## 2023-11-14 RX ORDER — AMOXICILLIN AND CLAVULANATE POTASSIUM 875; 125 MG/1; MG/1
1 TABLET, FILM COATED ORAL 2 TIMES DAILY
Qty: 10 TABLET | Refills: 0 | Status: SHIPPED | OUTPATIENT
Start: 2023-11-14 | End: 2023-11-19

## 2023-11-14 NOTE — PROGRESS NOTES
Virtual Visit: Established Patient   This visit was conducted via Zoom using secure and encrypted videoconferencing technology.   The patient was in their home in the [facility].    The patient's identity was confirmed and verbal consent was obtained for this virtual visit.     Subjective:   HPI  Caroline Grewal is a 43 y.o. female who presents with a chief complaint of sore throat, PND and headache.  Symptoms started 1 week ago.  Said symptoms improved over the weekend however returned yesterday and have worsened.  She has tried Mucinex and probiotics which have not helped.  Denies experiencing any cough or fevers.  She took a home COVID test which was negative.  No history of recurrent strep pharyngitis.    REVIEW OF SYSTEMS  General: no fever or chills  GI: no nausea or vomiting  See HPI for further details.    Current medicines (including changes today)  Current Outpatient Medications   Medication Sig Dispense Refill    amoxicillin-clavulanate (AUGMENTIN) 875-125 MG Tab Take 1 Tablet by mouth 2 times a day for 5 days. 10 Tablet 0    buPROPion (WELLBUTRIN XL) 300 MG XL tablet Take 1 Tablet by mouth every day. APPOINTMENT NEEDED FOR ADDITIONAL REFILLS.  CALL [phone]. 30 Tablet 1    albuterol 108 (90 Base) MCG/ACT Aero Soln inhalation aerosol INHALE 2 PUFFS BY MOUTH EVERY FOUR HOURS AS NEEDED FOR SHORTNESS OF BREATH. 1 Each 5     No current facility-administered medications for this visit.       Patient Active Problem List    Diagnosis Date Noted    Low sodium levels 2021    COVID-19 2020    Deviated septum 10/16/2020    Bulimia nervosa, purging type 10/16/2020    Moderate recurrent major depression (HCC) 2020    Generalized anxiety disorder 2020    Dyslipidemia 2020    Anxiety 2019          Objective:   PHYSICAL EXAM  VITAL SIGNS: unable due to nature of virtual visit.     Constitutional: Alert, awake, no distress  Skin: No rashes in visible  areas.  Neck: No meningeal signs. Full ROM  Lungs: Unlabored respiratory effort, no cough or audible wheeze  Psych: Alert and oriented x3, normal affect and mood.     Assessment/Plan:     1. Acute bacterial rhinosinusitis  amoxicillin-clavulanate (AUGMENTIN) 875-125 MG Tab      Second sickening consistent with bacterial sinus infection.  No history of penicillin allergies  -Ordered Augmentin  -Ibuprofen 800 mg 3 times daily as needed for sore throat and headache  -Fluid hydration and relative rest  -Return to urgent care any new/worsening symptoms or further questions or concerns.  Patient understood everything discussed.  All questions were answered.    Differential diagnosis, natural history, supportive care, and indications for immediate follow-up discussed. All questions answered. Patient agrees with the plan of care.    Follow-up as needed if symptoms worsen or fail to improve to PCP, Urgent care or Emergency Room.    Please note that this dictation was created using voice recognition software. I have made a reasonable attempt to correct obvious errors, but I expect that there are errors of grammar and possibly content that I did not discover before finalizing the note.

## 2023-11-17 ENCOUNTER — PHARMACY VISIT (OUTPATIENT)
Dept: PHARMACY | Facility: MEDICAL CENTER | Age: 43
End: 2023-11-17
Payer: MEDICARE

## 2023-11-17 PROCEDURE — RXMED WILLOW AMBULATORY MEDICATION CHARGE: Performed by: INTERNAL MEDICINE

## 2023-11-17 RX ORDER — INFLUENZA A VIRUS A/BRISBANE/02/2018 IVR-190 (H1N1) ANTIGEN (FORMALDEHYDE INACTIVATED), INFLUENZA A VIRUS A/KANSAS/14/2017 X-327 (H3N2) ANTIGEN (FORMALDEHYDE INACTIVATED), INFLUENZA B VIRUS B/PHUKET/3073/2013 ANTIGEN (FORMALDEHYDE INACTIVATED), AND INFLUENZA B VIRUS B/MARYLAND/15/2016 BX-69A ANTIGEN (FORMALDEHYDE INACTIVATED) 15; 15; 15; 15 UG/.5ML; UG/.5ML; UG/.5ML; UG/.5ML
0.5 INJECTION, SUSPENSION INTRAMUSCULAR
Qty: 0.5 ML | Refills: 0 | Status: SHIPPED | OUTPATIENT
Start: 2023-11-17 | End: 2024-02-06

## 2024-02-04 SDOH — HEALTH STABILITY: MENTAL HEALTH
STRESS IS WHEN SOMEONE FEELS TENSE, NERVOUS, ANXIOUS, OR CAN'T SLEEP AT NIGHT BECAUSE THEIR MIND IS TROUBLED. HOW STRESSED ARE YOU?: ONLY A LITTLE

## 2024-02-04 SDOH — ECONOMIC STABILITY: TRANSPORTATION INSECURITY
IN THE PAST 12 MONTHS, HAS THE LACK OF TRANSPORTATION KEPT YOU FROM MEDICAL APPOINTMENTS OR FROM GETTING MEDICATIONS?: NO

## 2024-02-04 SDOH — ECONOMIC STABILITY: HOUSING INSECURITY: IN THE LAST 12 MONTHS, HOW MANY PLACES HAVE YOU LIVED?: 1

## 2024-02-04 SDOH — ECONOMIC STABILITY: HOUSING INSECURITY
IN THE LAST 12 MONTHS, WAS THERE A TIME WHEN YOU DID NOT HAVE A STEADY PLACE TO SLEEP OR SLEPT IN A SHELTER (INCLUDING NOW)?: NO

## 2024-02-04 SDOH — HEALTH STABILITY: PHYSICAL HEALTH: ON AVERAGE, HOW MANY MINUTES DO YOU ENGAGE IN EXERCISE AT THIS LEVEL?: 50 MIN

## 2024-02-04 SDOH — HEALTH STABILITY: PHYSICAL HEALTH: ON AVERAGE, HOW MANY DAYS PER WEEK DO YOU ENGAGE IN MODERATE TO STRENUOUS EXERCISE (LIKE A BRISK WALK)?: 3 DAYS

## 2024-02-04 SDOH — ECONOMIC STABILITY: INCOME INSECURITY: HOW HARD IS IT FOR YOU TO PAY FOR THE VERY BASICS LIKE FOOD, HOUSING, MEDICAL CARE, AND HEATING?: NOT HARD AT ALL

## 2024-02-04 SDOH — ECONOMIC STABILITY: INCOME INSECURITY: IN THE LAST 12 MONTHS, WAS THERE A TIME WHEN YOU WERE NOT ABLE TO PAY THE MORTGAGE OR RENT ON TIME?: NO

## 2024-02-04 SDOH — ECONOMIC STABILITY: TRANSPORTATION INSECURITY
IN THE PAST 12 MONTHS, HAS LACK OF TRANSPORTATION KEPT YOU FROM MEETINGS, WORK, OR FROM GETTING THINGS NEEDED FOR DAILY LIVING?: NO

## 2024-02-04 SDOH — ECONOMIC STABILITY: FOOD INSECURITY: WITHIN THE PAST 12 MONTHS, YOU WORRIED THAT YOUR FOOD WOULD RUN OUT BEFORE YOU GOT MONEY TO BUY MORE.: NEVER TRUE

## 2024-02-04 SDOH — ECONOMIC STABILITY: FOOD INSECURITY: WITHIN THE PAST 12 MONTHS, THE FOOD YOU BOUGHT JUST DIDN'T LAST AND YOU DIDN'T HAVE MONEY TO GET MORE.: NEVER TRUE

## 2024-02-04 SDOH — ECONOMIC STABILITY: TRANSPORTATION INSECURITY
IN THE PAST 12 MONTHS, HAS LACK OF RELIABLE TRANSPORTATION KEPT YOU FROM MEDICAL APPOINTMENTS, MEETINGS, WORK OR FROM GETTING THINGS NEEDED FOR DAILY LIVING?: NO

## 2024-02-04 ASSESSMENT — SOCIAL DETERMINANTS OF HEALTH (SDOH)
HOW OFTEN DO YOU ATTEND CHURCH OR RELIGIOUS SERVICES?: NEVER
HOW MANY DRINKS CONTAINING ALCOHOL DO YOU HAVE ON A TYPICAL DAY WHEN YOU ARE DRINKING: 1 OR 2
HOW OFTEN DO YOU GET TOGETHER WITH FRIENDS OR RELATIVES?: ONCE A WEEK
WITHIN THE PAST 12 MONTHS, YOU WORRIED THAT YOUR FOOD WOULD RUN OUT BEFORE YOU GOT THE MONEY TO BUY MORE: NEVER TRUE
HOW OFTEN DO YOU ATTENT MEETINGS OF THE CLUB OR ORGANIZATION YOU BELONG TO?: NEVER
HOW OFTEN DO YOU ATTENT MEETINGS OF THE CLUB OR ORGANIZATION YOU BELONG TO?: NEVER
HOW OFTEN DO YOU ATTEND CHURCH OR RELIGIOUS SERVICES?: NEVER
IN A TYPICAL WEEK, HOW MANY TIMES DO YOU TALK ON THE PHONE WITH FAMILY, FRIENDS, OR NEIGHBORS?: TWICE A WEEK
HOW OFTEN DO YOU HAVE A DRINK CONTAINING ALCOHOL: MONTHLY OR LESS
DO YOU BELONG TO ANY CLUBS OR ORGANIZATIONS SUCH AS CHURCH GROUPS UNIONS, FRATERNAL OR ATHLETIC GROUPS, OR SCHOOL GROUPS?: NO
HOW OFTEN DO YOU HAVE SIX OR MORE DRINKS ON ONE OCCASION: LESS THAN MONTHLY
HOW OFTEN DO YOU GET TOGETHER WITH FRIENDS OR RELATIVES?: ONCE A WEEK
DO YOU BELONG TO ANY CLUBS OR ORGANIZATIONS SUCH AS CHURCH GROUPS UNIONS, FRATERNAL OR ATHLETIC GROUPS, OR SCHOOL GROUPS?: NO
HOW HARD IS IT FOR YOU TO PAY FOR THE VERY BASICS LIKE FOOD, HOUSING, MEDICAL CARE, AND HEATING?: NOT HARD AT ALL
IN A TYPICAL WEEK, HOW MANY TIMES DO YOU TALK ON THE PHONE WITH FAMILY, FRIENDS, OR NEIGHBORS?: TWICE A WEEK

## 2024-02-04 ASSESSMENT — LIFESTYLE VARIABLES
SKIP TO QUESTIONS 9-10: 0
AUDIT-C TOTAL SCORE: 2
HOW MANY STANDARD DRINKS CONTAINING ALCOHOL DO YOU HAVE ON A TYPICAL DAY: 1 OR 2
HOW OFTEN DO YOU HAVE A DRINK CONTAINING ALCOHOL: MONTHLY OR LESS
HOW OFTEN DO YOU HAVE SIX OR MORE DRINKS ON ONE OCCASION: LESS THAN MONTHLY

## 2024-02-06 ENCOUNTER — OFFICE VISIT (OUTPATIENT)
Dept: MEDICAL GROUP | Facility: PHYSICIAN GROUP | Age: 44
End: 2024-02-06
Payer: COMMERCIAL

## 2024-02-06 ENCOUNTER — APPOINTMENT (OUTPATIENT)
Dept: URGENT CARE | Facility: PHYSICIAN GROUP | Age: 44
End: 2024-02-06
Payer: COMMERCIAL

## 2024-02-06 VITALS
DIASTOLIC BLOOD PRESSURE: 74 MMHG | WEIGHT: 188 LBS | HEART RATE: 72 BPM | SYSTOLIC BLOOD PRESSURE: 108 MMHG | TEMPERATURE: 97.4 F | RESPIRATION RATE: 16 BRPM | BODY MASS INDEX: 25.47 KG/M2 | OXYGEN SATURATION: 97 % | HEIGHT: 72 IN

## 2024-02-06 DIAGNOSIS — F33.1 MODERATE RECURRENT MAJOR DEPRESSION (HCC): ICD-10-CM

## 2024-02-06 DIAGNOSIS — E55.9 VITAMIN D DEFICIENCY: ICD-10-CM

## 2024-02-06 DIAGNOSIS — E78.5 DYSLIPIDEMIA: ICD-10-CM

## 2024-02-06 DIAGNOSIS — F41.1 GENERALIZED ANXIETY DISORDER: ICD-10-CM

## 2024-02-06 DIAGNOSIS — Z82.49 FAMILY HISTORY OF MI (MYOCARDIAL INFARCTION): ICD-10-CM

## 2024-02-06 DIAGNOSIS — R53.83 OTHER FATIGUE: ICD-10-CM

## 2024-02-06 DIAGNOSIS — F50.2 BULIMIA NERVOSA, PURGING TYPE: ICD-10-CM

## 2024-02-06 DIAGNOSIS — Z11.3 SCREENING EXAMINATION FOR SEXUALLY TRANSMITTED DISEASE: ICD-10-CM

## 2024-02-06 DIAGNOSIS — Z13.6 SCREENING FOR CARDIOVASCULAR CONDITION: ICD-10-CM

## 2024-02-06 DIAGNOSIS — D64.9 ANEMIA, UNSPECIFIED TYPE: ICD-10-CM

## 2024-02-06 PROBLEM — J34.2 DEVIATED SEPTUM: Status: RESOLVED | Noted: 2020-10-16 | Resolved: 2024-02-06

## 2024-02-06 PROBLEM — F41.9 ANXIETY: Status: RESOLVED | Noted: 2019-09-13 | Resolved: 2024-02-06

## 2024-02-06 PROBLEM — U07.1 COVID-19: Status: RESOLVED | Noted: 2020-12-08 | Resolved: 2024-02-06

## 2024-02-06 PROCEDURE — 99214 OFFICE O/P EST MOD 30 MIN: CPT | Performed by: PHYSICIAN ASSISTANT

## 2024-02-06 PROCEDURE — 3078F DIAST BP <80 MM HG: CPT | Performed by: PHYSICIAN ASSISTANT

## 2024-02-06 PROCEDURE — 3074F SYST BP LT 130 MM HG: CPT | Performed by: PHYSICIAN ASSISTANT

## 2024-02-06 RX ORDER — LISDEXAMFETAMINE DIMESYLATE 50 MG
CAPSULE ORAL
COMMUNITY
Start: 2023-12-30 | End: 2024-02-06

## 2024-02-06 RX ORDER — ONDANSETRON 4 MG/1
TABLET, ORALLY DISINTEGRATING ORAL
COMMUNITY
Start: 2023-11-15 | End: 2024-02-06

## 2024-02-06 RX ORDER — SERTRALINE HYDROCHLORIDE 100 MG/1
TABLET, FILM COATED ORAL
COMMUNITY
Start: 2012-01-04 | End: 2024-02-06

## 2024-02-06 RX ORDER — NALTREXONE HYDROCHLORIDE 50 MG/1
TABLET, FILM COATED ORAL
COMMUNITY
Start: 2019-05-04

## 2024-02-06 RX ORDER — SERTRALINE HYDROCHLORIDE 200 MG/1
CAPSULE ORAL
COMMUNITY

## 2024-02-06 RX ORDER — LISDEXAMFETAMINE DIMESYLATE 60 MG/1
CAPSULE ORAL
COMMUNITY
Start: 2022-03-04

## 2024-02-06 ASSESSMENT — ENCOUNTER SYMPTOMS
SHORTNESS OF BREATH: 0
CHILLS: 0
FEVER: 0

## 2024-02-06 ASSESSMENT — FIBROSIS 4 INDEX: FIB4 SCORE: 0.74

## 2024-02-06 NOTE — PROGRESS NOTES
"Subjective:     CC: establish care; prior REBECCA Bender     HPI:   Caroline presents today with the following:    Problem   Bulimia Nervosa, Purging Type    Chronic, stable.  Managed by psychiatry.     Moderate Recurrent Major Depression (Hcc)    Chronic, stable.  Managed by psychiatry.     Generalized Anxiety Disorder    Chronic, stable.  Managed by psychiatry.     Dyslipidemia    Chronic, uncontrolled.    Latest Labs:   Lab Results   Component Value Date/Time    CHOLSTRLTOT 255 (H) 02/01/2023 08:29 AM     (H) 02/01/2023 08:29 AM    HDL 68 02/01/2023 08:29 AM    TRIGLYCERIDE 95 02/01/2023 08:29 AM      Medications: none    Risk calculator: The 10-year ASCVD risk score (Ad HELM, et al., 2019) is: 1.9%        Covid-19 (Resolved)   Deviated Septum (Resolved)   Anxiety (Resolved)                ROS:  Review of Systems   Constitutional:  Negative for chills and fever.   Respiratory:  Negative for shortness of breath.    Cardiovascular:  Negative for chest pain.       Objective:     Exam:  /74 (BP Location: Left arm, Patient Position: Sitting, BP Cuff Size: Adult)   Pulse 72   Temp 36.3 °C (97.4 °F) (Temporal)   Resp 16   Ht 1.854 m (6' 1\")   Wt 85.3 kg (188 lb)   LMP 01/28/2022   SpO2 97%   Breastfeeding No   BMI 24.80 kg/m²  Body mass index is 24.8 kg/m².    Physical Exam  Vitals reviewed.   Constitutional:       General: She is not in acute distress.     Appearance: Normal appearance.   Pulmonary:      Effort: Pulmonary effort is normal.   Neurological:      General: No focal deficit present.      Mental Status: She is alert.   Psychiatric:         Mood and Affect: Mood normal.         Behavior: Behavior normal.         Judgment: Judgment normal.                Assessment & Plan by Problem:     1. Dyslipidemia  Chronic, uncontrolled.  Discussed increasing plant-based foods, decreasing animal-based foods.  Increase daily activity, aiming for 30-45 minutes brisk exercise daily.    - Lipid " Profile; Future    2. Anemia, unspecified type  Chronic, control unknown.  Checking labs.    - CBC WITH DIFFERENTIAL; Future  - IRON/TOTAL IRON BIND; Future  - TRANSFERRIN SATURATION  - FERRITIN; Future    3. Other fatigue  Chronic, intermittent.  Checking labs.    - CBC WITH DIFFERENTIAL; Future  - Comp Metabolic Panel; Future  - TSH WITH REFLEX TO FT4; Future    4. Vitamin D deficiency  Chronic, control unknown.  Checking labs.    - VITAMIN D,25 HYDROXY (DEFICIENCY); Future    5. Screening for cardiovascular condition  Patient's father had an MI in his early 60s and her paternal grandmother had multiple stents.  Ordering calcium scoring.    - CT-HEART W/O CONT EVAL CALCIUM; Future    6. Family history of MI (myocardial infarction)  See #5.    - CT-HEART W/O CONT EVAL CALCIUM; Future    7. Bulimia nervosa, purging type  8. Generalized anxiety disorder  9. Moderate recurrent major depression (HCC)  Chronic, stable.  Managed by psychiatry.    10. Screening examination for sexually transmitted disease    - HIV AG/AB COMBO ASSAY SCREENING; Future      Have labs drawn.    Return if symptoms worsen or fail to improve, for lab discussion.      Healthcare Maintenance:        Please note that this dictation was created using voice recognition software. I have made every reasonable attempt to correct obvious errors, but I expect that there are errors of grammar and possibly content that I did not discover before finalizing the note.

## 2024-02-06 NOTE — ASSESSMENT & PLAN NOTE
Chronic, uncontrolled.    Latest Labs:   Lab Results   Component Value Date/Time    CHOLSTRLTOT 255 (H) 02/01/2023 08:29 AM     (H) 02/01/2023 08:29 AM    HDL 68 02/01/2023 08:29 AM    TRIGLYCERIDE 95 02/01/2023 08:29 AM      Medications: none    Risk calculator: The 10-year ASCVD risk score (Ad HELM, et al., 2019) is: 1.9%

## 2024-03-11 ENCOUNTER — APPOINTMENT (OUTPATIENT)
Dept: RADIOLOGY | Facility: MEDICAL CENTER | Age: 44
End: 2024-03-11
Attending: PHYSICIAN ASSISTANT
Payer: COMMERCIAL

## 2024-04-12 ENCOUNTER — OFFICE VISIT (OUTPATIENT)
Dept: URGENT CARE | Facility: PHYSICIAN GROUP | Age: 44
End: 2024-04-12
Payer: COMMERCIAL

## 2024-04-12 VITALS
RESPIRATION RATE: 14 BRPM | SYSTOLIC BLOOD PRESSURE: 114 MMHG | HEIGHT: 72 IN | WEIGHT: 190 LBS | TEMPERATURE: 97.6 F | HEART RATE: 72 BPM | BODY MASS INDEX: 25.73 KG/M2 | OXYGEN SATURATION: 98 % | DIASTOLIC BLOOD PRESSURE: 62 MMHG

## 2024-04-12 DIAGNOSIS — J06.9 VIRAL URI: ICD-10-CM

## 2024-04-12 DIAGNOSIS — R05.1 ACUTE COUGH: ICD-10-CM

## 2024-04-12 LAB
FLUAV RNA SPEC QL NAA+PROBE: NEGATIVE
FLUBV RNA SPEC QL NAA+PROBE: NEGATIVE
RSV RNA SPEC QL NAA+PROBE: NEGATIVE
SARS-COV-2 RNA RESP QL NAA+PROBE: NEGATIVE

## 2024-04-12 PROCEDURE — 99213 OFFICE O/P EST LOW 20 MIN: CPT | Performed by: NURSE PRACTITIONER

## 2024-04-12 PROCEDURE — 3078F DIAST BP <80 MM HG: CPT | Performed by: NURSE PRACTITIONER

## 2024-04-12 PROCEDURE — 3074F SYST BP LT 130 MM HG: CPT | Performed by: NURSE PRACTITIONER

## 2024-04-12 PROCEDURE — 0241U POCT CEPHEID COV-2, FLU A/B, RSV - PCR: CPT | Performed by: NURSE PRACTITIONER

## 2024-04-12 RX ORDER — DEXTROMETHORPHAN HYDROBROMIDE AND PROMETHAZINE HYDROCHLORIDE 15; 6.25 MG/5ML; MG/5ML
5 SYRUP ORAL EVERY 4 HOURS PRN
Qty: 120 ML | Refills: 0 | Status: SHIPPED | OUTPATIENT
Start: 2024-04-12

## 2024-04-12 ASSESSMENT — ENCOUNTER SYMPTOMS
SHORTNESS OF BREATH: 0
SENSORY CHANGE: 0
WEAKNESS: 0
HEADACHES: 1
COUGH: 1
MYALGIAS: 1
SORE THROAT: 0

## 2024-04-12 ASSESSMENT — VISUAL ACUITY: OU: 1

## 2024-04-12 ASSESSMENT — FIBROSIS 4 INDEX: FIB4 SCORE: 0.74

## 2024-04-12 NOTE — LETTER
April 12, 2024         Patient: Caroline Grewal   YOB: 1980   Date of Visit: 4/12/2024           To Whom it May Concern:    Caroline Grewal was seen in my clinic on 4/12/2024 due to illness. Due to medical necessity, please excuse patient from work 4/11, 4/12, 4/13, and 4/14.     If you have any questions or concerns, please don't hesitate to call.        Sincerely,         HAL Hood.  Electronically Signed

## 2024-04-12 NOTE — PROGRESS NOTES
Subjective:     Caroline Grewal is a 43 y.o. female who presents for Cough (X 4 days Cough, body aches, headache)       Cough  This is a new problem. The problem has been gradually worsening. Associated symptoms include headaches and myalgias. Pertinent negatives include no sore throat or shortness of breath. She has tried OTC cough suppressant for the symptoms.     RN.    Review of Systems   Constitutional:  Positive for malaise/fatigue.   HENT:  Positive for congestion. Negative for sore throat.    Respiratory:  Positive for cough. Negative for shortness of breath.    Musculoskeletal:  Positive for myalgias.   Neurological:  Positive for headaches. Negative for sensory change and weakness.   All other systems reviewed and are negative.    Refer to HPI for additional details.    During this visit, appropriate PPE was worn, and hand hygiene was performed.    PMH:  has a past medical history of Abnormal blood chemistry (2020), Anxiety, Cough (2020), Depression, GERD (gastroesophageal reflux disease), Hyperlipidemia, SOB (shortness of breath) (2021), and Substance abuse ([facility]).    MEDS:   Current Outpatient Medications:     promethazine-dextromethorphan (PROMETHAZINE-DM) 6.25-15 MG/5ML syrup, Take 5 mL by mouth every four hours as needed for Cough., Disp: 120 mL, Rfl: 0    VYVANSE 60 MG Cap, , Disp: , Rfl:     naltrexone (DEPADE) 50 MG Tab, , Disp: , Rfl:     Sertraline HCl 200 MG Cap, Take  by mouth., Disp: , Rfl:     buPROPion (WELLBUTRIN XL) 300 MG XL tablet, Take 1 Tablet by mouth every day. APPOINTMENT NEEDED FOR ADDITIONAL REFILLS.  CALL [phone]., Disp: 30 Tablet, Rfl: 1    ALLERGIES:   Allergies   Allergen Reactions    Dairy Food Allergy Rash     Pt states hands and legs     SURGHX:   Past Surgical History:   Procedure Laterality Date    ABDOMINAL EXPLORATION      Abdominoplasty    ABDOMINAL HYSTERECTOMY TOTAL       SOCHX:  reports that she has been smoking cigarettes. She  "has never used smokeless tobacco. She reports that she does not currently use alcohol after a past usage of about 6.6 oz of alcohol per week. She reports that she does not currently use drugs after having used the following drugs: Marijuana and Methamphetamines.    FH: Per HPI as applicable/pertinent.      Objective:     /62   Pulse 72   Temp 36.4 °C (97.6 °F) (Temporal)   Resp 14   Ht 1.854 m (6' 1\")   Wt 86.2 kg (190 lb)   LMP 01/28/2022   SpO2 98%   BMI 25.07 kg/m²     Physical Exam  Nursing note reviewed.   Constitutional:       General: She is not in acute distress.     Appearance: She is well-developed. She is not ill-appearing or toxic-appearing.   HENT:      Nose: Congestion present.      Mouth/Throat:      Mouth: Mucous membranes are moist.      Pharynx: Uvula midline. No pharyngeal swelling or posterior oropharyngeal erythema.      Comments: PND  Eyes:      General: Vision grossly intact.      Extraocular Movements: Extraocular movements intact.      Conjunctiva/sclera: Conjunctivae normal.   Neck:      Trachea: Phonation normal.   Cardiovascular:      Rate and Rhythm: Normal rate and regular rhythm.      Heart sounds: Normal heart sounds.   Pulmonary:      Effort: Pulmonary effort is normal. No respiratory distress.      Breath sounds: Normal breath sounds. No stridor. No decreased breath sounds, wheezing, rhonchi or rales.   Musculoskeletal:         General: No deformity. Normal range of motion.      Cervical back: Normal range of motion.   Skin:     General: Skin is warm and dry.      Coloration: Skin is not pale.   Neurological:      Mental Status: She is alert and oriented to person, place, and time.      Motor: No weakness.   Psychiatric:         Behavior: Behavior normal. Behavior is cooperative.     POCT Cepheid CoV-2, Flu A/B, RSV by PCR: negative      Assessment/Plan:     1. Acute cough  - POCT CEPHEID COV-2, FLU A/B, RSV - PCR  - promethazine-dextromethorphan (PROMETHAZINE-DM) " 6.25-15 MG/5ML syrup; Take 5 mL by mouth every four hours as needed for Cough.  Dispense: 120 mL; Refill: 0    2. Viral URI    Discussed likely self-limiting viral etiology and expected course and duration of illness. Vital signs stable, afebrile, no acute distress at this time.    Emphasize supportive measures, rest, fluids, and symptom management with over-the-counter medication as needed such as Tylenol and Motrin.    Rx as above sent electronically. Avoid OTC cough suppressants with similar ingredients.    Standard precautions/mask/wash hands. Suggest following CDC recommendations.    Monitor. Return precautions advised.     Differential diagnosis, natural history, supportive care, over-the-counter symptom management per 's instructions, close monitoring, and indications for immediate follow-up discussed.     All questions answered. Patient agrees with the plan of care.    Discharge summary provided.    Work note provided.

## 2024-09-25 ENCOUNTER — IMMUNIZATION (OUTPATIENT)
Dept: OCCUPATIONAL MEDICINE | Facility: CLINIC | Age: 44
End: 2024-09-25

## 2024-09-25 DIAGNOSIS — Z23 NEED FOR VACCINATION: Primary | ICD-10-CM

## 2024-10-22 ENCOUNTER — EH NON-PROVIDER (OUTPATIENT)
Dept: OCCUPATIONAL MEDICINE | Facility: CLINIC | Age: 44
End: 2024-10-22

## 2024-10-22 DIAGNOSIS — Z02.89 ENCOUNTER FOR OCCUPATIONAL HEALTH ASSESSMENT: ICD-10-CM

## 2024-10-22 PROCEDURE — 94375 RESPIRATORY FLOW VOLUME LOOP: CPT | Performed by: NURSE PRACTITIONER

## 2024-12-03 DIAGNOSIS — L98.9 SKIN LESION: ICD-10-CM

## 2024-12-20 DIAGNOSIS — Z11.1 SCREENING-PULMONARY TB: ICD-10-CM

## 2025-01-11 ENCOUNTER — HOSPITAL ENCOUNTER (OUTPATIENT)
Dept: LAB | Facility: MEDICAL CENTER | Age: 45
End: 2025-01-11
Attending: PHYSICIAN ASSISTANT
Payer: COMMERCIAL

## 2025-01-11 DIAGNOSIS — Z11.1 SCREENING-PULMONARY TB: ICD-10-CM

## 2025-01-11 PROCEDURE — 36415 COLL VENOUS BLD VENIPUNCTURE: CPT

## 2025-01-11 PROCEDURE — 86480 TB TEST CELL IMMUN MEASURE: CPT

## 2025-01-14 LAB
GAMMA INTERFERON BACKGROUND BLD IA-ACNC: 0.06 IU/ML
M TB IFN-G BLD-IMP: NEGATIVE
M TB IFN-G CD4+ BCKGRND COR BLD-ACNC: 0.14 IU/ML
MITOGEN IGNF BCKGRD COR BLD-ACNC: >10 IU/ML
QFT TB2 - NIL TBQ2: 0.04 IU/ML

## 2025-02-03 ENCOUNTER — APPOINTMENT (OUTPATIENT)
Dept: MEDICAL GROUP | Facility: PHYSICIAN GROUP | Age: 45
End: 2025-02-03
Payer: COMMERCIAL

## 2025-03-20 ENCOUNTER — APPOINTMENT (OUTPATIENT)
Dept: MEDICAL GROUP | Facility: PHYSICIAN GROUP | Age: 45
End: 2025-03-20
Payer: COMMERCIAL

## 2025-03-20 VITALS
BODY MASS INDEX: 25.55 KG/M2 | HEIGHT: 72 IN | RESPIRATION RATE: 19 BRPM | DIASTOLIC BLOOD PRESSURE: 66 MMHG | TEMPERATURE: 98.1 F | OXYGEN SATURATION: 97 % | HEART RATE: 83 BPM | SYSTOLIC BLOOD PRESSURE: 104 MMHG | WEIGHT: 188.6 LBS

## 2025-03-20 DIAGNOSIS — E78.5 DYSLIPIDEMIA: ICD-10-CM

## 2025-03-20 DIAGNOSIS — R53.83 FATIGUE, UNSPECIFIED TYPE: ICD-10-CM

## 2025-03-20 DIAGNOSIS — F33.1 MODERATE RECURRENT MAJOR DEPRESSION (HCC): ICD-10-CM

## 2025-03-20 DIAGNOSIS — F41.1 GENERALIZED ANXIETY DISORDER: ICD-10-CM

## 2025-03-20 DIAGNOSIS — F17.210 CONTINUOUS DEPENDENCE ON CIGARETTE SMOKING: ICD-10-CM

## 2025-03-20 DIAGNOSIS — Z00.00 WELLNESS EXAMINATION: ICD-10-CM

## 2025-03-20 DIAGNOSIS — Z11.3 SCREENING EXAMINATION FOR SEXUALLY TRANSMITTED DISEASE: ICD-10-CM

## 2025-03-20 DIAGNOSIS — Z12.31 ENCOUNTER FOR SCREENING MAMMOGRAM FOR MALIGNANT NEOPLASM OF BREAST: ICD-10-CM

## 2025-03-20 DIAGNOSIS — F50.20 BULIMIA NERVOSA, PURGING TYPE: ICD-10-CM

## 2025-03-20 PROCEDURE — 3078F DIAST BP <80 MM HG: CPT | Performed by: PHYSICIAN ASSISTANT

## 2025-03-20 PROCEDURE — 99396 PREV VISIT EST AGE 40-64: CPT | Performed by: PHYSICIAN ASSISTANT

## 2025-03-20 PROCEDURE — 3074F SYST BP LT 130 MM HG: CPT | Performed by: PHYSICIAN ASSISTANT

## 2025-03-20 RX ORDER — SERTRALINE HYDROCHLORIDE 100 MG/1
TABLET, FILM COATED ORAL
COMMUNITY
Start: 2024-12-30

## 2025-03-20 RX ORDER — LISDEXAMFETAMINE DIMESYLATE 50 MG/1
50 CAPSULE ORAL
COMMUNITY
Start: 2025-02-27

## 2025-03-20 RX ORDER — KETOCONAZOLE 20 MG/ML
SHAMPOO, SUSPENSION TOPICAL
COMMUNITY
Start: 2025-02-14

## 2025-03-20 ASSESSMENT — PATIENT HEALTH QUESTIONNAIRE - PHQ9
SUM OF ALL RESPONSES TO PHQ QUESTIONS 1-9: 0
SUM OF ALL RESPONSES TO PHQ9 QUESTIONS 1 AND 2: 0
3. TROUBLE FALLING OR STAYING ASLEEP OR SLEEPING TOO MUCH: NOT AT ALL
1. LITTLE INTEREST OR PLEASURE IN DOING THINGS: NOT AT ALL
2. FEELING DOWN, DEPRESSED, IRRITABLE, OR HOPELESS: NOT AT ALL
7. TROUBLE CONCENTRATING ON THINGS, SUCH AS READING THE NEWSPAPER OR WATCHING TELEVISION: NOT AT ALL
5. POOR APPETITE OR OVEREATING: NOT AT ALL
4. FEELING TIRED OR HAVING LITTLE ENERGY: NOT AT ALL
9. THOUGHTS THAT YOU WOULD BE BETTER OFF DEAD, OR OF HURTING YOURSELF: NOT AT ALL
8. MOVING OR SPEAKING SO SLOWLY THAT OTHER PEOPLE COULD HAVE NOTICED. OR THE OPPOSITE, BEING SO FIGETY OR RESTLESS THAT YOU HAVE BEEN MOVING AROUND A LOT MORE THAN USUAL: NOT AT ALL
6. FEELING BAD ABOUT YOURSELF - OR THAT YOU ARE A FAILURE OR HAVE LET YOURSELF OR YOUR FAMILY DOWN: NOT AL ALL

## 2025-03-20 ASSESSMENT — ENCOUNTER SYMPTOMS
SHORTNESS OF BREATH: 0
CHILLS: 0
FEVER: 0

## 2025-03-20 NOTE — PROGRESS NOTES
SUBJECTIVE:     CC: ; Established care 2/6/2024     HPI:   Caroline presents today with the following:    ASSESSMENT & PLAN by Problem:     Problem   Wellness Examination    Patient has no concerns today.  A Pap was not performed.     Continuous Dependence On Cigarette Smoking    Chronic, uncontrolled.  Started smoking again 2024 due to increased stress (getting ).  Using Nicorette gum.  Discussed other medication options.     Bulimia Nervosa, Purging Type    Chronic, stable.  Managed by psychiatry.      Moderate Recurrent Major Depression (Hcc)    Chronic, stable.  Managed by psychiatry.      Generalized Anxiety Disorder    Chronic, stable.  Managed by psychiatry.      Dyslipidemia    Chronic, uncontrolled.  Not currently medicated.  Increase plant-based foods, decrease animal-based foods.       Discussed trying red yeast rice to lower LDL.     Low Sodium Levels (Resolved)       Mammogram: ordered today    Pneumococcal vaccine: declines    Repeat labs.    Return in about 1 year (around 3/20/2026), or if symptoms worsen or fail to improve, for lab discussion.    Anticipatory Guidance:   Wear a seat belt in the car.  Keep guns in a gun safe/locked up.  Recommend wearing sunscreen when outside.  Stay active most days of the week.  Eat plenty fruits and vegetables.  Drink plenty of fluid (without caffeine or alcohol).           HPI:     Problem List Items Addressed This Visit       Bulimia nervosa, purging type    Continuous dependence on cigarette smoking    Dyslipidemia    Chronic, uncontrolled.    Latest Labs:   Lab Results   Component Value Date/Time    CHOLSTRLTOT 255 (H) 02/01/2023 08:29 AM     (H) 02/01/2023 08:29 AM    HDL 68 02/01/2023 08:29 AM    TRIGLYCERIDE 95 02/01/2023 08:29 AM      Medications: none    Risk calculator: The 10-year ASCVD risk score (Ad HELM, et al., 2019) is: 1.9%            Relevant Orders    Lipid Profile    Generalized anxiety disorder    Relevant Medications     "sertraline (ZOLOFT) 100 MG Tab    Moderate recurrent major depression (HCC)    Relevant Medications    sertraline (ZOLOFT) 100 MG Tab    Wellness examination     Other Visit Diagnoses         Fatigue, unspecified type        Relevant Orders    CBC WITH DIFFERENTIAL    Comp Metabolic Panel    TSH WITH REFLEX TO FT4      Screening examination for sexually transmitted disease        Relevant Orders    HIV AG/AB COMBO ASSAY SCREENING      Encounter for screening mammogram for malignant neoplasm of breast        Relevant Orders    MA-SCREENING MAMMO BILAT W/TOMOSYNTHESIS W/CAD                   ROS:  Review of Systems   Constitutional:  Negative for chills and fever.   Respiratory:  Negative for shortness of breath.    Cardiovascular:  Negative for chest pain.       OBJECTIVE:     Exam:  /66   Pulse 83   Temp 36.7 °C (98.1 °F) (Temporal)   Resp 19   Ht 1.854 m (6' 1\")   Wt 85.5 kg (188 lb 9.6 oz)   LMP 01/28/2022   SpO2 97%   BMI 24.88 kg/m²  Body mass index is 24.88 kg/m².    Physical Exam  Vitals reviewed.   Constitutional:       General: She is not in acute distress.     Appearance: Normal appearance.   HENT:      Head: Normocephalic and atraumatic.      Right Ear: Tympanic membrane, ear canal and external ear normal.      Left Ear: Tympanic membrane, ear canal and external ear normal.      Mouth/Throat:      Mouth: Mucous membranes are moist.      Pharynx: Oropharynx is clear. No oropharyngeal exudate or posterior oropharyngeal erythema.   Eyes:      Extraocular Movements: Extraocular movements intact.      Conjunctiva/sclera: Conjunctivae normal.      Pupils: Pupils are equal, round, and reactive to light.   Cardiovascular:      Rate and Rhythm: Normal rate and regular rhythm.      Heart sounds: Normal heart sounds.   Pulmonary:      Effort: Pulmonary effort is normal.      Breath sounds: Normal breath sounds.   Musculoskeletal:      Cervical back: Normal range of motion and neck supple. "   Lymphadenopathy:      Cervical: No cervical adenopathy.   Skin:     General: Skin is warm and dry.   Neurological:      General: No focal deficit present.      Mental Status: She is alert and oriented to person, place, and time.   Psychiatric:         Mood and Affect: Mood normal.         Behavior: Behavior normal.         Judgment: Judgment normal.           CHART REVIEW:     Labs:                  Please note that this dictation was created using voice recognition software. I have made every reasonable attempt to correct obvious errors, but I expect that there are errors of grammar and possibly content that I did not discover before finalizing the note.